# Patient Record
Sex: MALE | Race: WHITE | NOT HISPANIC OR LATINO | Employment: UNEMPLOYED | URBAN - METROPOLITAN AREA
[De-identification: names, ages, dates, MRNs, and addresses within clinical notes are randomized per-mention and may not be internally consistent; named-entity substitution may affect disease eponyms.]

---

## 2020-02-03 ENCOUNTER — HOSPITAL ENCOUNTER (OUTPATIENT)
Dept: EMERGENCY MEDICINE | Facility: HOSPITAL | Age: 55
End: 2020-02-03
Attending: EMERGENCY MEDICINE | Admitting: INTERNAL MEDICINE

## 2020-02-03 LAB
ABS NEUT (OLG): 2.38 X10(3)/MCL (ref 2.1–9.2)
ALBUMIN SERPL-MCNC: 3.8 GM/DL (ref 3.4–5)
ALBUMIN/GLOB SERPL: 1.2 {RATIO}
ALP SERPL-CCNC: 84 UNIT/L (ref 50–136)
ALT SERPL-CCNC: 122 UNIT/L (ref 12–78)
AST SERPL-CCNC: 67 UNIT/L (ref 15–37)
BASOPHILS # BLD AUTO: 0 X10(3)/MCL (ref 0–0.2)
BASOPHILS NFR BLD AUTO: 1 %
BILIRUB SERPL-MCNC: 0.7 MG/DL (ref 0.2–1)
BILIRUBIN DIRECT+TOT PNL SERPL-MCNC: 0.2 MG/DL (ref 0–0.2)
BILIRUBIN DIRECT+TOT PNL SERPL-MCNC: 0.5 MG/DL (ref 0–0.8)
BUN SERPL-MCNC: 10 MG/DL (ref 7–18)
CALCIUM SERPL-MCNC: 8.6 MG/DL (ref 8.5–10.1)
CHLORIDE SERPL-SCNC: 105 MMOL/L (ref 98–107)
CO2 SERPL-SCNC: 25 MMOL/L (ref 21–32)
CREAT SERPL-MCNC: 0.84 MG/DL (ref 0.7–1.3)
EOSINOPHIL # BLD AUTO: 0.2 X10(3)/MCL (ref 0–0.9)
EOSINOPHIL NFR BLD AUTO: 5 %
ERYTHROCYTE [DISTWIDTH] IN BLOOD BY AUTOMATED COUNT: 13.1 % (ref 11.5–17)
GLOBULIN SER-MCNC: 3.2 GM/DL (ref 2.4–3.5)
GLUCOSE SERPL-MCNC: 123 MG/DL (ref 74–106)
HCT VFR BLD AUTO: 49.4 % (ref 42–52)
HGB BLD-MCNC: 16.3 GM/DL (ref 14–18)
LYMPHOCYTES # BLD AUTO: 2 X10(3)/MCL (ref 0.6–4.6)
LYMPHOCYTES NFR BLD AUTO: 39 %
MCH RBC QN AUTO: 31.3 PG (ref 27–31)
MCHC RBC AUTO-ENTMCNC: 33 GM/DL (ref 33–36)
MCV RBC AUTO: 95 FL (ref 80–94)
MONOCYTES # BLD AUTO: 0.6 X10(3)/MCL (ref 0.1–1.3)
MONOCYTES NFR BLD AUTO: 10 %
NEUTROPHILS # BLD AUTO: 2.38 X10(3)/MCL (ref 2.1–9.2)
NEUTROPHILS NFR BLD AUTO: 45 %
PLATELET # BLD AUTO: 254 X10(3)/MCL (ref 130–400)
PMV BLD AUTO: 9.5 FL (ref 9.4–12.4)
POC TROPONIN: 0 NG/ML (ref 0–0.08)
POTASSIUM SERPL-SCNC: 4.4 MMOL/L (ref 3.5–5.1)
PROT SERPL-MCNC: 7 GM/DL (ref 6.4–8.2)
RBC # BLD AUTO: 5.2 X10(6)/MCL (ref 4.7–6.1)
SODIUM SERPL-SCNC: 137 MMOL/L (ref 136–145)
TROPONIN I SERPL-MCNC: <0.02 NG/ML (ref 0.02–0.49)
WBC # SPEC AUTO: 5.3 X10(3)/MCL (ref 4.5–11.5)

## 2022-04-30 NOTE — ED PROVIDER NOTES
Patient:   Boy Mathew             MRN: [id number]            FIN: [id number]               Age:   54 years     Sex:  Male     :  1965   Associated Diagnoses:   Chest pain   Author:   Sujit Gonzales MD      Basic Information   Time seen: Date & time 2/3/2020 09:48:00.   History source: Patient.   Arrival mode: Walking.   History limitation: None.   Additional information: Chief Complaint from Nursing Triage Note : Chief Complaint   2/3/2020 9:22 CST        Chief Complaint           pt reports pov c/o diffuse chest and neck tightness and pain since waking up this morning with SOB. pt states this pain has occurred intermittently x1 week, but wont go away today. denies cardiac hx, n/v/d. hx hep c  .      History of Present Illness   Mr. Mathew is a 54-year-old male with history of hepatitis C, cocaine use, and tobacco use who presents with complaint of chest pain for 1 week.  Patient describes pain as a 10/10 tight/squeezing, substernal, radiating to neck.  Has been occurring intermittently for past week.  Last episode at 8:30 AM morning of presentation and described as worst episode thus far leading him to present to ER.  No aggravating or alleviating factors recalled.  Associated with shortness of breath.  Denies nausea or diaphoresis.  Does admit to cocaine use, last used 2 days ago.  Smoked 2 packs/day for 40 years.  Denies history of heart or lung disease.  No other complaint of fever, cough, congestion, abdominal pain, change in bowel or urinary habits, skin changes, or lower extremity edema.      Review of Systems   Constitutional symptoms:  Negative except as documented in HPI.   Skin symptoms:  Negative except as documented in HPI.   ENMT symptoms:  Negative except as documented in HPI.   Respiratory symptoms:  Negative except as documented in HPI.   Cardiovascular symptoms:  Negative except as documented in HPI.   Gastrointestinal symptoms:  Negative except as documented in HPI.    Genitourinary symptoms:  Negative except as documented in HPI.   Musculoskeletal symptoms:  Negative except as documented in HPI.   Neurologic symptoms:  Negative except as documented in HPI.   Psychiatric symptoms:  Negative except as documented in HPI.             Additional review of systems information: All other systems reviewed and otherwise negative.      Health Status   Allergies: No known allergies.   Medications: None.      Past Medical/ Family/ Social History   Medical history: Hepatitis C, Cocaine abuse, Tobacco Use.   Surgical history: Abdominal hernia repair.   Family history: Denies family history of heart disease.   Social history: Alcohol use: Drinks 3 servings of alcohol daily, Tobacco use: Smokes 2 pack(s) per day, for the last 40 years, Drug use: Cocaine.      Physical Examination               Vital Signs   Vital Signs   2/3/2020 9:22 CST        Temperature Oral          36.3 DegC                             Temperature Oral (calculated)             97.34 DegF                             Peripheral Pulse Rate     97 bpm                             Respiratory Rate          16 br/min                             SpO2                      97 %                             Oxygen Therapy            Room air                             Systolic Blood Pressure   116 mmHg                             Diastolic Blood Pressure  80 mmHg  .   General:  Alert, mild distress.    Skin:  Warm, dry, intact.    Eye:  Extraocular movements are intact.   Neck:  No JVD, no carotid bruit.    Cardiovascular:  Regular rate and rhythm, No murmur, Normal peripheral perfusion, No edema, 2+ radial pulses bilaterally.    Respiratory:  Lungs are clear to auscultation, respirations are non-labored, breath sounds are equal.    Gastrointestinal:  Soft, Nontender, Non distended, Normal bowel sounds.    Musculoskeletal:  Normal ROM, normal strength, no tenderness, no swelling, no deformity.    Neurological:  No focal  neurological deficit observed, normal motor observed, normal speech observed.    Psychiatric:  Cooperative, appropriate mood & affect.       Medical Decision Making   Differential Diagnosis:  Angina, chest wall pain.    Documents reviewed:  Emergency department nurses' notes.   Orders  Launch Order Profile (Selected)   Inpatient Orders  Ordered  aspirin 325 mg oral tablet: 325 mg, form: Tab, Oral, Once, first dose 02/03/20 9:46:00 CST, stop date 02/03/20 9:46:00 CST, STAT, 4 chew tab = 5 grains  nitroglycerin 0.4 mg sublingual TAB: 0.4 mg, form: Tab-SL, SL, Once, first dose 02/03/20 9:45:00 CST, stop date 02/03/20 9:45:00 CST, STAT  nitroglycerin 0.4 mg sublingual TAB: 0.4 mg, form: Tab-SL, SL, q5min PRN for chest pain, first dose 02/03/20 9:46:00 CST, STAT  Ordered (Collected)  Automated Diff: NOW collect, 02/03/20 9:52:00 CST, Blood, Collected, Stop date 02/03/20 9:52:00 CST, Lab Collect, Print Label By Order Location, 02/03/20 9:45:00 CST  CBC w/ Auto Diff: NOW collect, 02/03/20 9:52:56 CST, BLOOD, Collected, Stop date 02/03/20 9:52:00 CST, Lab Collect  CMP: STAT collect, 02/03/20 9:52:56 CST, BLOOD, Collected, Stop date 02/03/20 9:52:00 CST, Lab Collect  POC iSTAT ER Troponin request: BLOOD, STAT collect, Collected, 02/03/20 9:52:56 CST, Stop date 02/03/20 9:52:00 CST, Lab Collect, Print Label By Order Location  Troponin-I: STAT collect, 02/03/20 9:52:56 CST, BLOOD, Collected, Stop date 02/03/20 9:52:00 CST, Lab Collect  Ordered (Exam Ordered)  CXR 2 Views: Routine, 02/03/20 9:45:00 CST, Chest Pain, None, Stretcher, Rad Type, Not Scheduled, 02/03/20 9:45:00 CST.   Electrocardiogram:  Time 2/3/2020 09:19:00, rate 93, normal sinus rhythm, No ST-T changes, normal NM & QRS intervals, EP Interp.    Results review:  Lab results : Lab View   2/3/2020 9:58 CST        POC Troponin              0.00 ng/mL    2/3/2020 9:52 CST        Sodium Lvl                137 mmol/L                             Potassium Lvl              4.4 mmol/L                             Chloride                  105 mmol/L                             CO2                       25.0 mmol/L                             Calcium Lvl               8.6 mg/dL                             Glucose Lvl               123 mg/dL  HI                             BUN                       10.0 mg/dL                             Creatinine                0.84 mg/dL                             eGFR-AA                   >60 mL/min/1.73 m2  NA                             eGFR-BETSY                  >60 mL/min/1.73 m2  NA                             Bili Total                0.7 mg/dL                             Bili Direct               0.20 mg/dL                             Bili Indirect             0.50 mg/dL                             AST                       67 unit/L  HI                             ALT                       122 unit/L  HI                             Alk Phos                  84 unit/L                             Total Protein             7.0 gm/dL                             Albumin Lvl               3.80 gm/dL                             Globulin                  3.20 gm/dL                             A/G Ratio                 1.2  NA                             Troponin-I                <0.02 ng/mL                             WBC                       5.3 x10(3)/mcL                             RBC                       5.20 x10(6)/mcL                             Hgb                       16.3 gm/dL                             Hct                       49.4 %                             Platelet                  254 x10(3)/mcL                             MCV                       95.0 fL  HI                             MCH                       31.3 pg  HI                             MCHC                      33.0 gm/dL                             RDW                       13.1 %                             MPV                       9.5 fL                              Abs Neut                  2.38 x10(3)/mcL                             Neutro Auto               45 %  NA                             Lymph Auto                39 %  NA                             Mono Auto                 10 %  NA                             Eos Auto                  5 %  NA                             Abs Eos                   0.2 x10(3)/mcL                             Basophil Auto             1 %  NA                             Abs Neutro                2.38 x10(3)/mcL                             Abs Lymph                 2.0 x10(3)/mcL                             Abs Mono                  0.6 x10(3)/mcL                             Abs Baso                  0.0 x10(3)/mcL  .   Radiology results:  Rad Results (ST)  < 12 hrs   Accession: [id number]  Order: XR Chest 2 Views  Report Dt/Tm: 02/03/2020 10:39  Report:      CHEST X-RAYS (2 Views):     CPT: 13005     HISTORY:  Chest Pain     FINDINGS:  Examination reveals mediastinal and cardiac silhouettes to be within  normal limits. Lung fields are clear and free of gross infiltrates  atelectases or effusions.     IMPRESSION: No active pulmonary disease    .      Reexamination/ Reevaluation   Time: 2/3/2020 11:05:00 .   Vital signs   results included from flowsheet : Vital Signs   2/3/2020 11:00 CST       Peripheral Pulse Rate     76 bpm                             Respiratory Rate          15 br/min                             SpO2                      93 %  LOW                             Oxygen Therapy            Room air                             Systolic Blood Pressure   101 mmHg                             Diastolic Blood Pressure  69 mmHg                             Mean Arterial Pressure, Cuff              80 mmHg     Course: improving.   Pain status: decreased.   Notes: Pain improved to 2/10 after SL nitro x 3 doses.  Received 325mg aspirin.  Troponin negative, repeat EKG unchanged and without ST changes.  CXR without acute  abnormality.  Consult placed to Cardiology for admission for chest pain and ACS rule out.      Impression and Plan   Diagnosis   Chest pain (BFE87-MI R07.9)      Calls-Consults   -  2/3/2020 11:13:00 , Cardiology, Spoke with DANIEL Giron, with Cardiology who recommends admission.  .    Plan   Condition: Guarded.    Disposition: Admit time  2/3/2020 11:22:00, Place in Observation Telemetry Unit.    Counseled: Patient, Regarding diagnosis, Regarding diagnostic results, Regarding treatment plan, Patient indicated understanding of instructions.      Sujit Gonzales MD  [facility], PGY-2

## 2022-04-30 NOTE — ED PROVIDER NOTES
Patient:   Boy Mathew             MRN: [id number]            FIN: [id number]               Age:   54 years     Sex:  Male     :  1965   Associated Diagnoses:   None   Author:   Linda GLASER MD, Luis Alberto MARTÍNEZ      Addendum      Teaching-Supervisory Addendum-Brief   I participated in the following activities of this patients care: the medical history, the physical exam, medical decision making.   I personally performed: the medical history, the physical exam, the medical decision making.   The case was discussed with: the resident.   Evaluation and management service: I agree with the evaluation and management decisions made in this patient's care.   Time seen: 2/3/2020 .   Vital signs: Basic Oxygen Information   2/3/2020 12:30 CST       SpO2                      98 %                             Oxygen Therapy            Room air    2/3/2020 12:00 CST       SpO2                      98 %                             Oxygen Therapy            Room air    2/3/2020 11:00 CST       SpO2                      93 %  LOW                             Oxygen Therapy            Room air    2/3/2020 10:04 CST       SpO2                      95 %                             Oxygen Therapy            Room air    2/3/2020 9:55 CST        SpO2                      95 %                             Oxygen Therapy            Room air    2/3/2020 9:45 CST        SpO2                      97 %                             Oxygen Therapy            Room air    2/3/2020 9:22 CST        SpO2                      97 %                             Oxygen Therapy            Room air  .   Notes: Patient was seen and evaluated by the resident I've also seen and evaluated this patient and performed my own independent history and physical this is a 54-year-old gentleman with a history of high blood pressure tobacco and cocaine abuse who is had intermittent chest pain for a week and it started hurting abruptly worse earlier today substernal  pressure going into the neck and left shoulder.  Patient no nausea no vomiting no history previous coronary artery disease or evaluation.  Patient EKG does not reveal any abnormalities on exam patient mildly anxious chest clear abdomen soft not suicidal or homicidal last cocaine use was 2 days ago.  EKG cardiac enzymes without abnormality some relief with nitroglycerin discussed case with cardiology will admit.  Cardiology did admit and evaluate patient for coronary angiogram patient did not leave the ER for cath lab or floor but of note patient abruptly left AMA cardiology was notified.

## 2022-10-10 DIAGNOSIS — K40.90 RIGHT INGUINAL HERNIA: Primary | ICD-10-CM

## 2023-12-27 ENCOUNTER — HOSPITAL ENCOUNTER (INPATIENT)
Facility: HOSPITAL | Age: 58
LOS: 1 days | Discharge: LEFT BEFORE DISCHARGE | DRG: 193 | End: 2023-12-28
Attending: STUDENT IN AN ORGANIZED HEALTH CARE EDUCATION/TRAINING PROGRAM | Admitting: INTERNAL MEDICINE
Payer: MEDICAID

## 2023-12-27 DIAGNOSIS — R09.02 HYPOXIA: ICD-10-CM

## 2023-12-27 DIAGNOSIS — J18.9 MULTIFOCAL PNEUMONIA: Primary | ICD-10-CM

## 2023-12-27 DIAGNOSIS — R06.02 SOB (SHORTNESS OF BREATH): ICD-10-CM

## 2023-12-27 LAB
ALBUMIN SERPL-MCNC: 3.6 G/DL (ref 3.5–5)
ALBUMIN/GLOB SERPL: 1 RATIO (ref 1.1–2)
ALP SERPL-CCNC: 70 UNIT/L (ref 40–150)
ALT SERPL-CCNC: 60 UNIT/L (ref 0–55)
AST SERPL-CCNC: 45 UNIT/L (ref 5–34)
B PERT.PT PRMT NPH QL NAA+NON-PROBE: NOT DETECTED
BASOPHILS # BLD AUTO: 0.07 X10(3)/MCL
BASOPHILS NFR BLD AUTO: 0.9 %
BILIRUB SERPL-MCNC: 0.4 MG/DL
BNP BLD-MCNC: 24.2 PG/ML
BUN SERPL-MCNC: 13.1 MG/DL (ref 8.4–25.7)
C PNEUM DNA NPH QL NAA+NON-PROBE: NOT DETECTED
CALCIUM SERPL-MCNC: 9 MG/DL (ref 8.4–10.2)
CHLORIDE SERPL-SCNC: 106 MMOL/L (ref 98–107)
CO2 SERPL-SCNC: 28 MMOL/L (ref 22–29)
CREAT SERPL-MCNC: 0.95 MG/DL (ref 0.73–1.18)
EOSINOPHIL # BLD AUTO: 0.22 X10(3)/MCL (ref 0–0.9)
EOSINOPHIL NFR BLD AUTO: 2.9 %
ERYTHROCYTE [DISTWIDTH] IN BLOOD BY AUTOMATED COUNT: 13.3 % (ref 11.5–17)
FLUAV AG UPPER RESP QL IA.RAPID: NOT DETECTED
FLUAV AG UPPER RESP QL IA.RAPID: NOT DETECTED
FLUBV AG UPPER RESP QL IA.RAPID: NOT DETECTED
FLUBV AG UPPER RESP QL IA.RAPID: NOT DETECTED
GFR SERPLBLD CREATININE-BSD FMLA CKD-EPI: >60 MLS/MIN/1.73/M2
GLOBULIN SER-MCNC: 3.5 GM/DL (ref 2.4–3.5)
GLUCOSE SERPL-MCNC: 139 MG/DL (ref 74–100)
HADV DNA NPH QL NAA+NON-PROBE: NOT DETECTED
HCOV 229E RNA NPH QL NAA+NON-PROBE: NOT DETECTED
HCOV HKU1 RNA NPH QL NAA+NON-PROBE: NOT DETECTED
HCOV NL63 RNA NPH QL NAA+NON-PROBE: NOT DETECTED
HCOV OC43 RNA NPH QL NAA+NON-PROBE: NOT DETECTED
HCT VFR BLD AUTO: 48.9 % (ref 42–52)
HGB BLD-MCNC: 15.7 G/DL (ref 14–18)
HIV 1+2 AB+HIV1 P24 AG SERPL QL IA: NONREACTIVE
HMPV RNA NPH QL NAA+NON-PROBE: NOT DETECTED
HPIV1 RNA NPH QL NAA+NON-PROBE: NOT DETECTED
HPIV2 RNA NPH QL NAA+NON-PROBE: NOT DETECTED
HPIV3 RNA NPH QL NAA+NON-PROBE: NOT DETECTED
HPIV4 RNA NPH QL NAA+NON-PROBE: NOT DETECTED
IMM GRANULOCYTES # BLD AUTO: 0.02 X10(3)/MCL (ref 0–0.04)
IMM GRANULOCYTES NFR BLD AUTO: 0.3 %
LACTATE SERPL-SCNC: 2 MMOL/L (ref 0.5–2.2)
LYMPHOCYTES # BLD AUTO: 2.41 X10(3)/MCL (ref 0.6–4.6)
LYMPHOCYTES NFR BLD AUTO: 32.1 %
M PNEUMO DNA NPH QL NAA+NON-PROBE: NOT DETECTED
MCH RBC QN AUTO: 30.4 PG (ref 27–31)
MCHC RBC AUTO-ENTMCNC: 32.1 G/DL (ref 33–36)
MCV RBC AUTO: 94.8 FL (ref 80–94)
MONOCYTES # BLD AUTO: 0.7 X10(3)/MCL (ref 0.1–1.3)
MONOCYTES NFR BLD AUTO: 9.3 %
MRSA PCR SCRN (OHS): NOT DETECTED
NEUTROPHILS # BLD AUTO: 4.09 X10(3)/MCL (ref 2.1–9.2)
NEUTROPHILS NFR BLD AUTO: 54.5 %
NRBC BLD AUTO-RTO: 0 %
PLATELET # BLD AUTO: 305 X10(3)/MCL (ref 130–400)
PMV BLD AUTO: 9 FL (ref 7.4–10.4)
POTASSIUM SERPL-SCNC: 4.2 MMOL/L (ref 3.5–5.1)
PROT SERPL-MCNC: 7.1 GM/DL (ref 6.4–8.3)
RBC # BLD AUTO: 5.16 X10(6)/MCL (ref 4.7–6.1)
RSV A 5' UTR RNA NPH QL NAA+PROBE: NOT DETECTED
RSV RNA NPH QL NAA+NON-PROBE: NOT DETECTED
RV+EV RNA NPH QL NAA+NON-PROBE: NOT DETECTED
SARS-COV-2 RNA RESP QL NAA+PROBE: NOT DETECTED
SARS-COV-2 RNA RESP QL NAA+PROBE: NOT DETECTED
SODIUM SERPL-SCNC: 141 MMOL/L (ref 136–145)
STREP A PCR (OHS): NOT DETECTED
TROPONIN I SERPL-MCNC: <0.01 NG/ML (ref 0–0.04)
WBC # SPEC AUTO: 7.51 X10(3)/MCL (ref 4.5–11.5)

## 2023-12-27 PROCEDURE — 94640 AIRWAY INHALATION TREATMENT: CPT

## 2023-12-27 PROCEDURE — 87040 BLOOD CULTURE FOR BACTERIA: CPT | Performed by: STUDENT IN AN ORGANIZED HEALTH CARE EDUCATION/TRAINING PROGRAM

## 2023-12-27 PROCEDURE — 87641 MR-STAPH DNA AMP PROBE: CPT | Performed by: INTERNAL MEDICINE

## 2023-12-27 PROCEDURE — 87798 DETECT AGENT NOS DNA AMP: CPT | Performed by: NURSE PRACTITIONER

## 2023-12-27 PROCEDURE — 99285 EMERGENCY DEPT VISIT HI MDM: CPT | Mod: 25

## 2023-12-27 PROCEDURE — 96375 TX/PRO/DX INJ NEW DRUG ADDON: CPT

## 2023-12-27 PROCEDURE — 85025 COMPLETE CBC W/AUTO DIFF WBC: CPT

## 2023-12-27 PROCEDURE — 25000003 PHARM REV CODE 250: Performed by: NURSE PRACTITIONER

## 2023-12-27 PROCEDURE — 80053 COMPREHEN METABOLIC PANEL: CPT

## 2023-12-27 PROCEDURE — 0241U COVID/RSV/FLU A&B PCR: CPT | Performed by: INTERNAL MEDICINE

## 2023-12-27 PROCEDURE — 93005 ELECTROCARDIOGRAM TRACING: CPT

## 2023-12-27 PROCEDURE — 83605 ASSAY OF LACTIC ACID: CPT | Performed by: STUDENT IN AN ORGANIZED HEALTH CARE EDUCATION/TRAINING PROGRAM

## 2023-12-27 PROCEDURE — 87651 STREP A DNA AMP PROBE: CPT

## 2023-12-27 PROCEDURE — 25000242 PHARM REV CODE 250 ALT 637 W/ HCPCS: Performed by: STUDENT IN AN ORGANIZED HEALTH CARE EDUCATION/TRAINING PROGRAM

## 2023-12-27 PROCEDURE — S4991 NICOTINE PATCH NONLEGEND: HCPCS | Performed by: NURSE PRACTITIONER

## 2023-12-27 PROCEDURE — 87449 NOS EACH ORGANISM AG IA: CPT | Performed by: INTERNAL MEDICINE

## 2023-12-27 PROCEDURE — 93010 EKG 12-LEAD: ICD-10-PCS | Mod: ,,, | Performed by: INTERNAL MEDICINE

## 2023-12-27 PROCEDURE — 0240U COVID/FLU A&B PCR: CPT

## 2023-12-27 PROCEDURE — 86480 TB TEST CELL IMMUN MEASURE: CPT | Performed by: INTERNAL MEDICINE

## 2023-12-27 PROCEDURE — 84484 ASSAY OF TROPONIN QUANT: CPT

## 2023-12-27 PROCEDURE — 87305 ASPERGILLUS AG IA: CPT | Performed by: INTERNAL MEDICINE

## 2023-12-27 PROCEDURE — 63600175 PHARM REV CODE 636 W HCPCS: Performed by: STUDENT IN AN ORGANIZED HEALTH CARE EDUCATION/TRAINING PROGRAM

## 2023-12-27 PROCEDURE — 94640 AIRWAY INHALATION TREATMENT: CPT | Mod: XB

## 2023-12-27 PROCEDURE — 25000003 PHARM REV CODE 250: Performed by: STUDENT IN AN ORGANIZED HEALTH CARE EDUCATION/TRAINING PROGRAM

## 2023-12-27 PROCEDURE — 93010 ELECTROCARDIOGRAM REPORT: CPT | Mod: ,,, | Performed by: INTERNAL MEDICINE

## 2023-12-27 PROCEDURE — 25500020 PHARM REV CODE 255: Performed by: STUDENT IN AN ORGANIZED HEALTH CARE EDUCATION/TRAINING PROGRAM

## 2023-12-27 PROCEDURE — 11000001 HC ACUTE MED/SURG PRIVATE ROOM

## 2023-12-27 PROCEDURE — 87389 HIV-1 AG W/HIV-1&-2 AB AG IA: CPT | Performed by: INTERNAL MEDICINE

## 2023-12-27 PROCEDURE — 96374 THER/PROPH/DIAG INJ IV PUSH: CPT

## 2023-12-27 PROCEDURE — 83880 ASSAY OF NATRIURETIC PEPTIDE: CPT

## 2023-12-27 PROCEDURE — 99900035 HC TECH TIME PER 15 MIN (STAT)

## 2023-12-27 RX ORDER — DEXAMETHASONE SODIUM PHOSPHATE 4 MG/ML
8 INJECTION, SOLUTION INTRA-ARTICULAR; INTRALESIONAL; INTRAMUSCULAR; INTRAVENOUS; SOFT TISSUE
Status: COMPLETED | OUTPATIENT
Start: 2023-12-27 | End: 2023-12-27

## 2023-12-27 RX ORDER — IBUPROFEN 200 MG
1 TABLET ORAL DAILY
Status: DISCONTINUED | OUTPATIENT
Start: 2023-12-28 | End: 2023-12-27

## 2023-12-27 RX ORDER — IPRATROPIUM BROMIDE AND ALBUTEROL SULFATE 2.5; .5 MG/3ML; MG/3ML
3 SOLUTION RESPIRATORY (INHALATION)
Status: COMPLETED | OUTPATIENT
Start: 2023-12-27 | End: 2023-12-27

## 2023-12-27 RX ORDER — IPRATROPIUM BROMIDE AND ALBUTEROL SULFATE 2.5; .5 MG/3ML; MG/3ML
3 SOLUTION RESPIRATORY (INHALATION) EVERY 4 HOURS PRN
Status: DISCONTINUED | OUTPATIENT
Start: 2023-12-27 | End: 2023-12-28 | Stop reason: HOSPADM

## 2023-12-27 RX ORDER — DEXAMETHASONE SODIUM PHOSPHATE 4 MG/ML
4 INJECTION, SOLUTION INTRA-ARTICULAR; INTRALESIONAL; INTRAMUSCULAR; INTRAVENOUS; SOFT TISSUE EVERY 12 HOURS
Status: DISCONTINUED | OUTPATIENT
Start: 2023-12-28 | End: 2023-12-28 | Stop reason: HOSPADM

## 2023-12-27 RX ORDER — IBUPROFEN 200 MG
1 TABLET ORAL DAILY
Status: DISCONTINUED | OUTPATIENT
Start: 2023-12-27 | End: 2023-12-28 | Stop reason: HOSPADM

## 2023-12-27 RX ADMIN — NICOTINE 1 PATCH: 21 PATCH, EXTENDED RELEASE TRANSDERMAL at 08:12

## 2023-12-27 RX ADMIN — SODIUM CHLORIDE, POTASSIUM CHLORIDE, SODIUM LACTATE AND CALCIUM CHLORIDE 1000 ML: 600; 310; 30; 20 INJECTION, SOLUTION INTRAVENOUS at 03:12

## 2023-12-27 RX ADMIN — IPRATROPIUM BROMIDE AND ALBUTEROL SULFATE 3 ML: 2.5; .5 SOLUTION RESPIRATORY (INHALATION) at 01:12

## 2023-12-27 RX ADMIN — IOPAMIDOL 100 ML: 755 INJECTION, SOLUTION INTRAVENOUS at 02:12

## 2023-12-27 RX ADMIN — CEFTRIAXONE 1 G: 1 INJECTION, POWDER, FOR SOLUTION INTRAMUSCULAR; INTRAVENOUS at 03:12

## 2023-12-27 RX ADMIN — DEXAMETHASONE SODIUM PHOSPHATE 8 MG: 4 INJECTION, SOLUTION INTRA-ARTICULAR; INTRALESIONAL; INTRAMUSCULAR; INTRAVENOUS; SOFT TISSUE at 01:12

## 2023-12-27 RX ADMIN — AZITHROMYCIN MONOHYDRATE 500 MG: 500 INJECTION, POWDER, LYOPHILIZED, FOR SOLUTION INTRAVENOUS at 04:12

## 2023-12-27 RX ADMIN — IPRATROPIUM BROMIDE AND ALBUTEROL SULFATE 3 ML: 2.5; .5 SOLUTION RESPIRATORY (INHALATION) at 03:12

## 2023-12-27 NOTE — H&P
[facility]  Hospital Medicine History & Physical Examination       Patient Name: Boy Mathew  MRN: [id number]  Patient Class: Emergency   Admission Date: 12/27/2023   Admitting Service: Hospital Medicine   Length of Stay: 0  Attending Physician: Wenceslao Moran MD  Primary Care Provider: Geri, Primary Doctor  Face-to-Face encounter date: 12/27/2023  Code Status:  Full  Chief Complaint: Generalized Body Aches (Pt c/o body aches/subjective fever/chills/cough/sore throat m0lnfrs ago off and on. Pt also reports chest pain/SOB x1 month. Pt reports that he has mold in his trailer. )      Patient information was obtained from patient, patient's family, past medical records and ER records.      HISTORY OF PRESENT ILLNESS:   Boy Mathew is a 58 y.o. male with a PMHx of tobacco abuse who presented to [facility] on 12/27/2023 with c/o generalized body aches x4 days.  He also endorsed subjective fevers, chills, abdominal pain, nausea, vomiting, cough, sore throat that has been intermittent over the past 1 month along with SOB.  He reports that his home has mold present in his home that he was remodeling.    Upon presentation to ED, vital signed included /71, HR 68, RR 20, SpO2 91% on room air, temperature 98.5° F. He was subsequently placed on supplemental oxygen at 2 L via nasal cannula.  Labs were notable for glucose 139, AST 45, ALT 60.  BNP normal.  Troponin undetectable.  Influenza, Strep A, and COVID-19 negative.  Blood cultures x2 obtained.  CXR negative for acute cardiopulmonary process.  CT chest abdomen and pelvis with IV contrast demonstrated indeterminate enlarged left axillary and pectoral lymph nodes, left upper lobe opacities favored infectious or inflammatory; also noted associated bronchiectasis and mild emphysema.  He was started on IV ceftriaxone and azithromycin.  He required duo nebs x2 and was also given IV Decadron in ED. admitted to hospital medicine services for further  medical management.    REVIEW OF SYSTEMS:   Except as documented, all other systems reviewed and negative     PAST MEDICAL HISTORY:   Tobacco abuse    PAST SURGICAL HISTORY:   Reviewed and negative    FAMILY HISTORY:   Reviewed and negative    SOCIAL HISTORY:   Denied alcohol, illicit drug use.  Smokes 2 packs of cigarettes per day.    ALLERGIES:   Patient has no known allergies.    HOME MEDICATIONS:     Prior to Admission medications    Not on File     ________________________________________________________________________  INPATIENT LIST OF MEDICATIONS     Current Facility-Administered Medications:     albuterol-ipratropium 2.5 mg-0.5 mg/3 mL nebulizer solution 3 mL, 3 mL, Nebulization, Q4H PRN, Irena Lacey AGACNP-BC    [START ON 12/28/2023] azithromycin (ZITHROMAX) 500 mg in dextrose 5 % (D5W) 250 mL IVPB, 500 mg, Intravenous, Q24H, Irena Lacey AGACNP-BC    azithromycin 500 mg in dextrose 5 % 250 mL IVPB (ready to mix), 500 mg, Intravenous, ED 1 Time, Ephraim Baker MD    cefTRIAXone (ROCEPHIN) 1 g in dextrose 5 % in water (D5W) 100 mL IVPB (MB+), 1 g, Intravenous, ED 1 Time, Ephraim Baker MD, Last Rate: 200 mL/hr at 12/27/23 1550, 1 g at 12/27/23 1550    [START ON 12/28/2023] cefTRIAXone (ROCEPHIN) 1 g in dextrose 5 % in water (D5W) 100 mL IVPB (MB+), 1 g, Intravenous, Q24H, Irena Lacey AGACNP-BC    [START ON 12/28/2023] dexAMETHasone injection 4 mg, 4 mg, Intravenous, Q12H, Irena Lacey AGACNP-BC    lactated ringers bolus 1,000 mL, 1,000 mL, Intravenous, ED 1 Time, Ephraim Baker MD, Last Rate: 999 mL/hr at 12/27/23 1515, 1,000 mL at 12/27/23 1515  No current outpatient medications on file.    Scheduled Meds:   [START ON 12/28/2023] azithromycin (ZITHROMAX) 500 mg in dextrose 5 % (D5W) 250 mL IVPB  500 mg Intravenous Q24H    azithromycin  500 mg Intravenous ED 1 Time    cefTRIAXone (ROCEPHIN) IVPB  1 g Intravenous ED 1 Time    [START ON 12/28/2023] cefTRIAXone  (ROCEPHIN) IVPB  1 g Intravenous Q24H    [START ON 12/28/2023] dexAMETHasone  4 mg Intravenous Q12H    lactated ringers  1,000 mL Intravenous ED 1 Time     Continuous Infusions:  PRN Meds:.albuterol-ipratropium    PHYSICAL EXAM:     VITAL SIGNS: 24 HRS MIN & MAX LAST   Temp  Min: 98.5 °F (36.9 °C)  Max: 98.5 °F (36.9 °C) 98.5 °F (36.9 °C)   BP  Min: 103/77  Max: 117/72 108/70   Pulse  Min: 66  Max: 81  81   Resp  Min: 12  Max: 20 13   SpO2  Min: 87 %  Max: 98 % 96 %       General appearance: Well-developed male in no apparent distress.  HENT: Atraumatic head. Moist mucous membranes of oral cavity.  Eyes: Normal extraocular movements.   Neck: Supple.   Lungs: Clear to auscultation bilaterally.  On oxygen.  Heart: Regular rate and rhythm. S1 and S2 present. No pedal edema.  Abdomen: Soft, non-distended, non-tender.  Extremities: No cyanosis, clubbing  Skin: No Rash.   Neuro: Motor and sensory exams grossly intact.   Psych/mental status: Appropriate mood and affect. Responds appropriately to questions.     LABS AND IMAGING:     Recent Labs   Lab 12/27/23  1112   WBC 7.51   RBC 5.16   HGB 15.7   HCT 48.9   MCV 94.8*   MCH 30.4   MCHC 32.1*   RDW 13.3      MPV 9.0       Recent Labs   Lab 12/27/23  1112      K 4.2   CO2 28   BUN 13.1   CREATININE 0.95   CALCIUM 9.0   ALBUMIN 3.6   ALKPHOS 70   ALT 60*   AST 45*   BILITOT 0.4       Microbiology Results (last 7 days)       Procedure Component Value Units Date/Time    Blood Culture #1 **CANNOT BE ORDERED STAT** [[id number]] Collected: 12/27/23 1536    Order Status: Sent Specimen: Blood Updated: 12/27/23 1548    Blood Culture #2 **CANNOT BE ORDERED STAT** [[id number]] Collected: 12/27/23 1536    Order Status: Sent Specimen: Blood Updated: 12/27/23 1547             CT Chest Abdomen Pelvis With IV Contrast (XPD) NO Oral Contrast  Narrative: EXAMINATION:  CT CHEST ABDOMEN PELVIS WITH IV CONTRAST (XPD)    CLINICAL HISTORY:  productive cough, sob, RLQ abdominal  pain;    TECHNIQUE:  Helical acquisition from the thoracic inlet through the ischia with  IV contrast. Three plane reconstructions made available for review.  mGycm. Automatic exposure control, adjustment of mA/kV or iterative reconstruction technique was used to reduce radiation.    COMPARISON:  None available.    FINDINGS:  Chest.    Heart is not enlarged.  There are coronary artery calcifications.    There is no mediastinal or hilar adenopathy.  There are enlarged left axillary and pectoral lymph nodes.  For example image 26 series 2 measures 14 mm short axis.    There is no pleural effusion.  Mild atelectasis or scarring towards the lung bases.  There is irregular left upper lobe consolidation medially.  There is associated bronchiectasis with debris in bronchi.  Possible broncholiths image 31 series 2.  There is mild emphysema.    Abdomen and pelvis.    The liver is mildly enlarged.  There are gallstones.  No pericholecystic inflammation.  Spleen, pancreas and adrenals within normal limits.  No hydronephrosis or suspicious renal lesion.    There is no bowel obstruction.  Normal appendix.  Moderate stool in the colon.    Urinary bladder unremarkable.  No pelvic free fluid.  Abdominal aorta normal in caliber.  Moderate atherosclerotic disease.  Small fat containing right inguinal hernia.  No enlarged retroperitoneal lymph nodes.    There are mild degenerative changes of the spine.  Impression: 1. Indeterminate enlarged left axillary and pectoral lymph nodes.  2. Left upper lobe opacities favored infectious or inflammatory.  Recommend a follow-up chest CT in 1-2 months.  3. No acute abdominopelvic findings.    Electronically signed by: Gibson March  Date:    12/27/2023  Time:    14:54  X-Ray Chest PA And Lateral  Narrative: EXAMINATION:  XR CHEST PA AND LATERAL    CLINICAL HISTORY:  Shortness of breath    TECHNIQUE:  Two views of the chest    COMPARISON:  02/03/2020    FINDINGS:  No focal opacification,  pleural effusion, or pneumothorax.    The cardiomediastinal silhouette is within normal limits.    No acute osseous abnormality.  Impression: No acute cardiopulmonary process.    Electronically signed by: Varun Patricia  Date:    12/27/2023  Time:    11:10        ASSESSMENT & PLAN:     Acute hypoxemic respiratory failure   Left upper lobe pneumonia   Tobacco abuse  Transaminitis    Plan:   Supplemental O2 as needed to maintain oxygen saturation greater than 92%   IV ceftriaxone azithromycin daily   Respiratory panel and RSV PCR ordered   Nicotine patch daily   Abdominal ultrasound ordered to evaluate for elevated AST/ALT  Labs in a.m.      VTE Prophylaxis: SCDs    Discharge Planning and Disposition: TBD    I, Irena Lacey, NP have reviewed and discussed the case with Wenceslao Moran MD  Please see the attending MD's addendum for further assessment and plan.    Irena Lacey, [facility]  Department of Hospital Medicine   [facility]   12/27/2023    _______________________________________________________________________________  MD Addendum:  I, Dr.Praneet Dean MD , assumed care of this patient today   For the patient encounter, I performed the substantive portion of the visit, I reviewed the NP/PA documentation, treatment plan, and medical decision making.  I had face to face time with this patient     58-year-old male presented to hospital for evaluation of generalized body aches, subjective fevers, chills, intermittent cough productive sore throat  associated with worsening dyspnea on exertion for the past month.  He also reported losing 30 lb of weight in the past 3 months.  Currently living in a trailer and reported is exposed to mold.    Patient reported he had pulmonary tuberculosis approximately 10 years ago when he was in prison, he claimed he was treated and completed the treatment but reported he was treated for at least a month unsure of the total duration of the  treatment did not follow up with anyone later.    While in the emergency room patient was noted to be hypoxic 87% on room air, improved sats above 94 with a 2 L of nasal cannula.  COVID flu RSV negative, strep throat negative respiratory panel negative.  Labs with no leukocytosis.  BNP normal , trop wnl    CT chest obtained showed indeterminate enlarged left axillary and pectoral lymph nodes with a left upper lobe irregular consolidation medially associated with bronchiectasis with the debris in the bronchi favoring infectious or inflammatory etiology.    Patient is alert and oriented no acute distress on nasal cannula able to complete sentences lung exam air movement good with some rhonchi on the left side abdomen soft.    A/p:  Left upper lobe irregular consolidation medially -R/o TB or any infectious etiology  Hx: TB?       Unclear if patient had any viral pneumonia followed by any superimposed bacterial versus any kind of fungal pneumonia versus reactivation of tuberculosis    Check QuantiFERON , AM sputum samples for AFB x3 check beta D glucan, galactomannan, PJP PCR, HIV  Send respiratory culture, MRSA PCR   empiric antibiotic therapy   Supplemental oxygen target sats 92-94   Steroids  Derrick p.r.n.   Home medications reviewed with the patient, reported he does not take any medications at home  Airborne precautions  Consider pulmonary or infectious disease consultation if any of the test comes back positive    Code status: Full code    12/27/2023

## 2023-12-27 NOTE — ED PROVIDER NOTES
Encounter Date: 12/27/2023    SCRIBE #1 NOTE: I, Chela Contreras, am scribing for, and in the presence of,  Ephraim Baker MD. I have scribed the following portions of the note - Other sections scribed: HPI, ROS, PE.       History     Chief Complaint   Patient presents with    Generalized Body Aches     Pt c/o body aches/subjective fever/chills/cough/sore throat [id number] ago off and on. Pt also reports chest pain/SOB x1 month. Pt reports that he has mold in his trailer.      Patient is a 58 year old male with no significant history that presents to the ED for generalized body aches onset 4 days ago. Patient reports associated symptoms of fever, chills, abdominal pain, nausea, and vomiting. Patient smokes a couple packs of cigarettes a day.     The history is provided by the patient and medical records. No  was used.     Review of patient's allergies indicates:  No Known Allergies  No past medical history on file.  No past surgical history on file.  No family history on file.     Review of Systems   Constitutional:  Positive for chills and fever.        Generalized body aches    HENT:  Negative for drooling.    Eyes:  Negative for pain and redness.   Respiratory:  Negative for shortness of breath, wheezing and stridor.    Cardiovascular:  Negative for chest pain, palpitations and leg swelling.   Gastrointestinal:  Positive for nausea and vomiting. Negative for abdominal pain.   Genitourinary:  Negative for dysuria and hematuria.   Musculoskeletal:  Negative for back pain, neck pain and neck stiffness.   Skin:  Negative for rash and wound.   Neurological:  Negative for weakness and numbness.   Hematological:  Does not bruise/bleed easily.       Physical Exam     Initial Vitals [12/27/23 1034]   BP Pulse Resp Temp SpO2   115/71 68 20 98.5 °F (36.9 °C) (!) 91 %      MAP       --         Physical Exam    Nursing note and vitals reviewed.  Constitutional: He appears well-developed. He is not  diaphoretic. No distress.   HENT:   Head: Normocephalic and atraumatic.   Nose: Nose normal.   Mouth/Throat: Oropharynx is clear and moist.   Eyes: Conjunctivae and EOM are normal. Pupils are equal, round, and reactive to light.   Cardiovascular:  Normal rate and regular rhythm.           No murmur heard.  Pulmonary/Chest: No respiratory distress. He has no rales.   Scattered wheezes bilaterally.    Abdominal: Abdomen is soft. He exhibits no distension.   Generalized abdominal tenderness  There is no rebound and no guarding.     Neurological: He is alert and oriented to person, place, and time. He has normal strength. No cranial nerve deficit.   Skin: Skin is warm. Capillary refill takes less than 2 seconds. No rash noted.         ED Course   Critical Care    Date/Time: 12/27/2023 10:37 PM    Performed by: Ephraim Baker MD  Authorized by: Ephraim Baker MD  Direct patient critical care time: 35 minutes  Total critical care time (exclusive of procedural time) : 35 minutes  Critical care time was exclusive of separately billable procedures and treating other patients.  Critical care was necessary to treat or prevent imminent or life-threatening deterioration of the following conditions: respiratory failure.  Critical care was time spent personally by me on the following activities: development of treatment plan with patient or surrogate, discussions with consultants, evaluation of patient's response to treatment, obtaining history from patient or surrogate, examination of patient, ordering and performing treatments and interventions, ordering and review of laboratory studies, ordering and review of radiographic studies, re-evaluation of patient's condition, pulse oximetry and review of old charts.        Labs Reviewed   COMPREHENSIVE METABOLIC PANEL - Abnormal; Notable for the following components:       Result Value    Glucose Level 139 (*)     Albumin/Globulin Ratio 1.0 (*)     Alanine  Aminotransferase 60 (*)     Aspartate Aminotransferase 45 (*)     All other components within normal limits   CBC WITH DIFFERENTIAL - Abnormal; Notable for the following components:    MCV 94.8 (*)     MCHC 32.1 (*)     All other components within normal limits   B-TYPE NATRIURETIC PEPTIDE - Normal   COVID/FLU A&B PCR - Normal    Narrative:     The Xpert Xpress SARS-CoV-2/FLU/RSV plus is a rapid, multiplexed real-time PCR test intended for the simultaneous qualitative detection and differentiation of SARS-CoV-2, Influenza A, Influenza B, and respiratory syncytial virus (RSV) viral RNA in either nasopharyngeal swab or nasal swab specimens.         TROPONIN I - Normal   STREP GROUP A BY PCR - Normal    Narrative:     The Xpert Xpress Strep A test is a rapid, qualitative in vitro diagnostic test for the detection of Streptococcus pyogenes (Group A ß-hemolytic Streptococcus, Strep A) in throat swab specimens from patients with signs and symptoms of pharyngitis.     LACTIC ACID, PLASMA - Normal   RESPIRATORY PANEL - Normal    Narrative:     The BioFire Respiratory Panel 2.1 (RP2.1) is a PCR-based multiplexed nucleic acid test intended for use with the BioFire® 2.0 for simultaneous qualitative detection and identification of multiple respiratory viral and bacterial nucleic acids in nasopharyngeal swabs (NPS) obtained from individuals suspected of respiratory tract infections.   COVID/RSV/FLU A&B PCR - Normal    Narrative:     The Xpert Xpress SARS-CoV-2/FLU/RSV plus is a rapid, multiplexed real-time PCR test intended for the simultaneous qualitative detection and differentiation of SARS-CoV-2, Influenza A, Influenza B, and respiratory syncytial virus (RSV) viral RNA in either nasopharyngeal swab or nasal swab specimens.         MRSA PCR - Normal    Narrative:     The Xpert MRSA Assay utilizes automated real-time polymerase chain reaction (PCR) to detect MRSA DNA.  A positive test result does not necessarily indicate the  presence of viable organism.  It is however, presumptive for the presence of MRSA.   HIV 1 / 2 ANTIBODY - Normal   BLOOD CULTURE [facility]   BLOOD CULTURE [facility]   RESPIRATORY CULTURE ([facility])   CBC W/ AUTO DIFFERENTIAL    Narrative:     The following orders were created for panel order CBC auto differential.  Procedure                               Abnormality         Status                     ---------                               -----------         ------                     CBC with Differential[[id number]]        Abnormal            Final result                 Please view results for these tests on the individual orders.   FUNGITELL ASSAY FOR (1.3)-B-D-GLUCANS   ASPERGILLUS (GALACTOMANNAN) AG, SERUM   QUANTIFERON GOLD TB   PNEUMOCYSTIS JIROVEII BY PCR        ECG Results              EKG 12-lead (Final result)  Result time 12/27/23 11:52:10      Final result by Interface, Lab In [facility] (12/27/23 11:52:10)                   Narrative:    Test Reason : R06.02,    Vent. Rate : 077 BPM     Atrial Rate : 077 BPM     P-R Int : 130 ms          QRS Dur : 080 ms      QT Int : 388 ms       P-R-T Axes : 070 077 077 degrees     QTc Int : 439 ms    Normal sinus rhythm  Normal ECG  No previous ECGs available  Confirmed by Waylon Joseph MD ([id number]) on 12/27/2023 11:52:00 AM    Referred By: [email or web address]   SELF           Confirmed By:Waylon Joseph MD                                  Imaging Results              US Abdomen Limited (Final result)  Result time 12/27/23 17:05:19      Final result by Gibson March MD (12/27/23 17:05:19)                   Impression:      1. Cholelithiasis without sonographic evidence of acute cholecystitis.  2. No biliary ductal dilatation.      Electronically signed by: Gibson March  Date:    12/27/2023  Time:    17:05               Narrative:    EXAMINATION:  US ABDOMEN LIMITED    CLINICAL HISTORY:  elevated LFT;    COMPARISON:  CT earlier today.    FINDINGS:  Grayscale, color and spectral doppler  evaluation of the right upper quadrant.    The pancreas is obscured.  Imaged portions of aorta and IVC normal in caliber.    The liver is mildly enlarged.  No focal liver lesion is seen.  Normal hepatopetal flow is noted in the portal vein.    There are gallstones.  No significant gallbladder wall thickening or pericholecystic fluid.  The common bile duct is normal in caliber  and measures 2 mm.    Right kidney measures 12 cm in length. No hydronephrosis.                                       CT Chest Abdomen Pelvis With IV Contrast (XPD) NO Oral Contrast (Final result)  Result time 12/27/23 14:54:23      Final result by Gibson March MD (12/27/23 14:54:23)                   Impression:      1. Indeterminate enlarged left axillary and pectoral lymph nodes.  2. Left upper lobe opacities favored infectious or inflammatory.  Recommend a follow-up chest CT in 1-2 months.  3. No acute abdominopelvic findings.      Electronically signed by: Gibson March  Date:    12/27/2023  Time:    14:54               Narrative:    EXAMINATION:  CT CHEST ABDOMEN PELVIS WITH IV CONTRAST (XPD)    CLINICAL HISTORY:  productive cough, sob, RLQ abdominal pain;    TECHNIQUE:  Helical acquisition from the thoracic inlet through the ischia with  IV contrast. Three plane reconstructions made available for review.  mGycm. Automatic exposure control, adjustment of mA/kV or iterative reconstruction technique was used to reduce radiation.    COMPARISON:  None available.    FINDINGS:  Chest.    Heart is not enlarged.  There are coronary artery calcifications.    There is no mediastinal or hilar adenopathy.  There are enlarged left axillary and pectoral lymph nodes.  For example image 26 series 2 measures 14 mm short axis.    There is no pleural effusion.  Mild atelectasis or scarring towards the lung bases.  There is irregular left upper lobe consolidation medially.  There is associated bronchiectasis with debris in bronchi.  Possible  broncholiths image 31 series 2.  There is mild emphysema.    Abdomen and pelvis.    The liver is mildly enlarged.  There are gallstones.  No pericholecystic inflammation.  Spleen, pancreas and adrenals within normal limits.  No hydronephrosis or suspicious renal lesion.    There is no bowel obstruction.  Normal appendix.  Moderate stool in the colon.    Urinary bladder unremarkable.  No pelvic free fluid.  Abdominal aorta normal in caliber.  Moderate atherosclerotic disease.  Small fat containing right inguinal hernia.  No enlarged retroperitoneal lymph nodes.    There are mild degenerative changes of the spine.                                       X-Ray Chest PA And Lateral (Final result)  Result time 12/27/23 11:10:57      Final result by Varun Gomez MD (12/27/23 11:10:57)                   Impression:      No acute cardiopulmonary process.      Electronically signed by: Varun Gomez  Date:    12/27/2023  Time:    11:10               Narrative:    EXAMINATION:  XR CHEST PA AND LATERAL    CLINICAL HISTORY:  Shortness of breath    TECHNIQUE:  Two views of the chest    COMPARISON:  02/03/2020    FINDINGS:  No focal opacification, pleural effusion, or pneumothorax.    The cardiomediastinal silhouette is within normal limits.    No acute osseous abnormality.                                       Medications   cefTRIAXone (ROCEPHIN) 1 g in dextrose 5 % in water (D5W) 100 mL IVPB (MB+) (has no administration in time range)   azithromycin (ZITHROMAX) 500 mg in dextrose 5 % (D5W) 250 mL IVPB (has no administration in time range)   albuterol-ipratropium 2.5 mg-0.5 mg/3 mL nebulizer solution 3 mL (has no administration in time range)   dexAMETHasone injection 4 mg (has no administration in time range)   nicotine 21 mg/24 hr 1 patch (1 patch Transdermal Patch Applied 12/27/23 2049)   albuterol-ipratropium 2.5 mg-0.5 mg/3 mL nebulizer solution 3 mL (3 mLs Nebulization Given 12/27/23 1303)   dexAMETHasone injection 8  mg (8 mg Intravenous Given 12/27/23 1303)   iopamidoL (ISOVUE-370) injection 100 mL (100 mLs Intravenous Given 12/27/23 1419)   lactated ringers bolus 1,000 mL (0 mLs Intravenous Stopped 12/27/23 1615)   cefTRIAXone (ROCEPHIN) 1 g in dextrose 5 % in water (D5W) 100 mL IVPB (MB+) (0 g Intravenous Stopped 12/27/23 1620)   azithromycin 500 mg in dextrose 5 % 250 mL IVPB (ready to mix) (0 mg Intravenous Stopped 12/27/23 1729)   albuterol-ipratropium 2.5 mg-0.5 mg/3 mL nebulizer solution 3 mL (3 mLs Nebulization Given 12/27/23 1533)     Medical Decision Making  Amount and/or Complexity of Data Reviewed  Labs: ordered.  Radiology: ordered and independent interpretation performed.  ECG/medicine tests: ordered and independent interpretation performed.    Risk  Prescription drug management.  Decision regarding hospitalization.    Differential diagnosis (includes but is not limited to):   ACS, arrhythmia, electrolyte abnormalities, dehydration, kidney injury, PE, pneumothorax, pneumonia, sepsis, COPD, emphysema, CHF    MDM Narrative  58-year-old male presents for evaluation of generalized body aches, productive cough, chills, and fevers at home.  Patient is hypoxic and requiring supplemental oxygen.  COVID/flu swab pending.  Pain and nausea control as needed.  Infectious workup including blood cultures and lactic acid pending.  Chest x-ray pending.  EKG to be performed.  CT scan of the chest pending for further evaluation of the patient's symptoms.  Continue supplemental oxygen.    Update:  Labs reviewed.  Imaging reviewed consistent with a pneumonia.  Patient continues to require supplemental oxygen despite inhaled bronchodilators.  Antibiotic therapy ordered.  Hold off on full 30 cc/kilos IV fluid bolus given concern for volume overload.  Continue bronchodilators.  Continue antibiotics.  Case discussed with hospitalist, will admit.    Dispo:  Admit    My independent radiology interpretation:  As above  Point of care US  "(independently performed and interpreted):   Decision rules/clinical scoring:     Sepsis Perfusion Assessment: "I attest a sepsis perfusion exam was performed within 6 hours of sepsis, severe sepsis, or septic shock presentation, following fluid resuscitation."     Amount and/or Complexity of Data Reviewed  Independent historian: none   Summary of history:   External data reviewed: notes from previous admissions, notes from previous ED visits, and notes from clinic visits  Summary of data reviewed:  Prior records reviewed  Risk and benefits of testing: discussed   Labs: ordered and reviewed  Radiology: ordered and independent interpretation performed (see above or ED course)  ECG/medicine tests: ordered and independent interpretation performed (see above or ED course)  Discussion of management or test interpretation with external provider(s): discussed with hospitalist physician   Summary of discussion:  As above    Risk  Parenteral controlled substances   Drug therapy requiring intense monitoring for toxicity   Decision regarding hospitalization  Shared decision making     Critical Care  30-74 minutes     Data Reviewed/Counseling: I have personally reviewed the patient's vital signs, nursing notes, and other relevant tests, information, and imaging. I had a detailed discussion regarding the historical points, exam findings, and any diagnostic results supporting the discharge diagnosis. I personally performed the history, PE, MDM and procedures as documented above and agree with the scribe's documentation.    Portions of this note were dictated using voice recognition software. Although it was reviewed for accuracy, some inherent voice recognition errors may have occurred and may be present in this document.          Scribe Attestation:   Scribe #1: I performed the above scribed service and the documentation accurately describes the services I performed. I attest to the accuracy of the note.    Attending " Attestation:           Physician Attestation for Scribe:  Physician Attestation Statement for Scribe #1: I, Ephraim Baker MD, reviewed documentation, as scribed by Chela Contreras in my presence, and it is both accurate and complete.             ED Course as of 12/27/23 2239   Wed Dec 27, 2023   1400 EKG independently interpreted by me.  EKG: NSR @ 77, no STEMI, Qtc 439 [MC]   1400 X-Ray Chest PA And Lateral  Independently visualized/reviewed by me during the ED visit.  - No lobar consolidation, no PTX []   1544 Paged hospital medicine  [ED]   1551 Spoke with [facility] medicine  [ED]      ED Course User Index  [ED] Chela Contreras  [MC] Ephraim Baker MD                           Clinical Impression:  Final diagnoses:  [R06.02] SOB (shortness of breath)  [J18.9] Multifocal pneumonia (Primary)  [R09.02] Hypoxia          ED Disposition Condition    Admit Stable                Ephraim Baker MD  12/27/23 2239

## 2023-12-27 NOTE — FIRST PROVIDER EVALUATION
Medical screening examination initiated.  I have conducted a focused provider triage encounter, findings are as follows:    Brief history of present illness:  Patient is a 58-year-old male who presents to the emergency department with complaints of generalized body aches, cough, and sore throat intermittently x1 month.  He also reports subjective fever. Patient also reports chest pain and shortness of breath that started when he started working on his camper.    Vitals:    12/27/23 1034 12/27/23 1038   BP: 115/71    Pulse: 68    Resp: 20    Temp: 98.5 °F (36.9 °C)    TempSrc: Oral    SpO2: (!) 91% 98%   Weight: 81.6 kg (180 lb)        Pertinent physical exam:  Awake and alert, NAD.     Brief workup plan:  Labs, EKG, swabs, cxr     Preliminary workup initiated; this workup will be continued and followed by the physician or advanced practice provider that is assigned to the patient when roomed.

## 2023-12-28 VITALS
DIASTOLIC BLOOD PRESSURE: 75 MMHG | BODY MASS INDEX: 24.38 KG/M2 | SYSTOLIC BLOOD PRESSURE: 116 MMHG | HEART RATE: 71 BPM | TEMPERATURE: 98 F | WEIGHT: 180 LBS | RESPIRATION RATE: 18 BRPM | OXYGEN SATURATION: 91 % | HEIGHT: 72 IN

## 2023-12-28 LAB
ALBUMIN SERPL-MCNC: 3.3 G/DL (ref 3.5–5)
ALBUMIN/GLOB SERPL: 1 RATIO (ref 1.1–2)
ALP SERPL-CCNC: 56 UNIT/L (ref 40–150)
ALT SERPL-CCNC: 49 UNIT/L (ref 0–55)
AST SERPL-CCNC: 32 UNIT/L (ref 5–34)
BASOPHILS # BLD AUTO: 0.03 X10(3)/MCL
BASOPHILS NFR BLD AUTO: 0.3 %
BILIRUB SERPL-MCNC: 0.4 MG/DL
BUN SERPL-MCNC: 14.9 MG/DL (ref 8.4–25.7)
CALCIUM SERPL-MCNC: 8.9 MG/DL (ref 8.4–10.2)
CHLORIDE SERPL-SCNC: 109 MMOL/L (ref 98–107)
CO2 SERPL-SCNC: 25 MMOL/L (ref 22–29)
CREAT SERPL-MCNC: 0.93 MG/DL (ref 0.73–1.18)
EOSINOPHIL # BLD AUTO: 0 X10(3)/MCL (ref 0–0.9)
EOSINOPHIL NFR BLD AUTO: 0 %
ERYTHROCYTE [DISTWIDTH] IN BLOOD BY AUTOMATED COUNT: 13.2 % (ref 11.5–17)
GFR SERPLBLD CREATININE-BSD FMLA CKD-EPI: >60 MLS/MIN/1.73/M2
GLOBULIN SER-MCNC: 3.4 GM/DL (ref 2.4–3.5)
GLUCOSE SERPL-MCNC: 174 MG/DL (ref 74–100)
HCT VFR BLD AUTO: 44.8 % (ref 42–52)
HGB BLD-MCNC: 14.8 G/DL (ref 14–18)
IMM GRANULOCYTES # BLD AUTO: 0.03 X10(3)/MCL (ref 0–0.04)
IMM GRANULOCYTES NFR BLD AUTO: 0.3 %
LYMPHOCYTES # BLD AUTO: 2.01 X10(3)/MCL (ref 0.6–4.6)
LYMPHOCYTES NFR BLD AUTO: 19.4 %
MCH RBC QN AUTO: 30.8 PG (ref 27–31)
MCHC RBC AUTO-ENTMCNC: 33 G/DL (ref 33–36)
MCV RBC AUTO: 93.3 FL (ref 80–94)
MONOCYTES # BLD AUTO: 0.63 X10(3)/MCL (ref 0.1–1.3)
MONOCYTES NFR BLD AUTO: 6.1 %
NEUTROPHILS # BLD AUTO: 7.64 X10(3)/MCL (ref 2.1–9.2)
NEUTROPHILS NFR BLD AUTO: 73.9 %
NRBC BLD AUTO-RTO: 0 %
PLATELET # BLD AUTO: 300 X10(3)/MCL (ref 130–400)
PMV BLD AUTO: 9.3 FL (ref 7.4–10.4)
POTASSIUM SERPL-SCNC: 4.2 MMOL/L (ref 3.5–5.1)
PROT SERPL-MCNC: 6.7 GM/DL (ref 6.4–8.3)
RBC # BLD AUTO: 4.8 X10(6)/MCL (ref 4.7–6.1)
SODIUM SERPL-SCNC: 142 MMOL/L (ref 136–145)
WBC # SPEC AUTO: 10.34 X10(3)/MCL (ref 4.5–11.5)

## 2023-12-28 PROCEDURE — 80053 COMPREHEN METABOLIC PANEL: CPT | Performed by: NURSE PRACTITIONER

## 2023-12-28 PROCEDURE — 85025 COMPLETE CBC W/AUTO DIFF WBC: CPT | Performed by: NURSE PRACTITIONER

## 2023-12-28 NOTE — DISCHARGE SUMMARY
[facility] Hospital Medicine Discharge Summary    Admit Date: 12/27/2023  Discharge Date and Time: 12/28/20239:07 AM  Admitting Physician: WILIAM Team  Discharging Physician: Wenceslao Moran MD.  Primary Care Physician: Geri, Primary Doctor  Consults: None    Discharge Diagnoses:  Left upper lobe irregular consolidation medially -R/o TB or any infectious etiology  Hx: TB?     Hospital Course:    58 y.o. male with a PMHx of tobacco abuse who presented to [facility] on 12/27/2023 with c/o generalized body aches x4 days.  He also endorsed subjective fevers, chills, abdominal pain, nausea, vomiting, cough, sore throat that has been intermittent over the past 1 month along with SOB.  He reports that his home has mold present in his home that he was remodeling.     Upon presentation to ED, vital signed included /71, HR 68, RR 20, SpO2 91% on room air, temperature 98.5° F. He was subsequently placed on supplemental oxygen at 2 L via nasal cannula.  Labs were notable for glucose 139, AST 45, ALT 60.  BNP normal.  Troponin undetectable.  Influenza, Strep A, and COVID-19 negative.  Blood cultures x2 obtained.  CXR negative for acute cardiopulmonary process.  CT chest abdomen and pelvis with IV contrast demonstrated indeterminate enlarged left axillary and pectoral lymph nodes, left upper lobe opacities favored infectious or inflammatory; also noted associated bronchiectasis and mild emphysema.  He was started on IV ceftriaxone and azithromycin.  He required duo nebs x2 and was also given IV Decadron in ED. admitted to hospital medicine services for further medical management.    Work up sent     Per chart review, pt eloped on 12/28/23 5:43 AM    Pt not seen on discharge day  Vitals:  VITAL SIGNS: 24 HRS MIN & MAX LAST   Temp  Min: 97.7 °F (36.5 °C)  Max: 98.5 °F (36.9 °C) 97.7 °F (36.5 °C)   BP  Min: 102/68  Max: 132/71 116/75   Pulse  Min: 66  Max: 98  71   Resp  Min: 12  Max: 20 18   SpO2  Min:  87 %  Max: 100 % (!) 91 %       Physical Exam:  Pt eloped 12/28/23 5:43 AM     Procedures Performed: No admission procedures for hospital encounter.     Significant Diagnostic Studies: See Full reports for all details    Recent Labs   Lab 12/27/23  1112 12/28/23  0445   WBC 7.51 10.34   RBC 5.16 4.80   HGB 15.7 14.8   HCT 48.9 44.8   MCV 94.8* 93.3   MCH 30.4 30.8   MCHC 32.1* 33.0   RDW 13.3 13.2    300   MPV 9.0 9.3       Recent Labs   Lab 12/27/23  1112 12/28/23  0445    142   K 4.2 4.2   CO2 28 25   BUN 13.1 14.9   CREATININE 0.95 0.93   CALCIUM 9.0 8.9   ALBUMIN 3.6 3.3*   ALKPHOS 70 56   ALT 60* 49   AST 45* 32   BILITOT 0.4 0.4        Microbiology Results (last 7 days)       Procedure Component Value Units Date/Time    AFB Smear [[id number]]     Order Status: Canceled Specimen: Sputum, Expectorated     Respiratory Culture [[id number]]     Order Status: Canceled Specimen: Sputum, Expectorated     Blood Culture #1 **CANNOT BE ORDERED STAT** [[id number]] Collected: 12/27/23 1536    Order Status: Resulted Specimen: Blood Updated: 12/27/23 1548    Blood Culture #2 **CANNOT BE ORDERED STAT** [[id number]] Collected: 12/27/23 1536    Order Status: Resulted Specimen: Blood Updated: 12/27/23 1547             US Abdomen Limited  Narrative: EXAMINATION:  US ABDOMEN LIMITED    CLINICAL HISTORY:  elevated LFT;    COMPARISON:  CT earlier today.    FINDINGS:  Grayscale, color and spectral doppler evaluation of the right upper quadrant.    The pancreas is obscured.  Imaged portions of aorta and IVC normal in caliber.    The liver is mildly enlarged.  No focal liver lesion is seen.  Normal hepatopetal flow is noted in the portal vein.    There are gallstones.  No significant gallbladder wall thickening or pericholecystic fluid.  The common bile duct is normal in caliber  and measures 2 mm.    Right kidney measures 12 cm in length. No hydronephrosis.  Impression: 1. Cholelithiasis without sonographic evidence of  acute cholecystitis.  2. No biliary ductal dilatation.    Electronically signed by: Gibson March  Date:    12/27/2023  Time:    17:05  CT Chest Abdomen Pelvis With IV Contrast (XPD) NO Oral Contrast  Narrative: EXAMINATION:  CT CHEST ABDOMEN PELVIS WITH IV CONTRAST (XPD)    CLINICAL HISTORY:  productive cough, sob, RLQ abdominal pain;    TECHNIQUE:  Helical acquisition from the thoracic inlet through the ischia with  IV contrast. Three plane reconstructions made available for review.  mGycm. Automatic exposure control, adjustment of mA/kV or iterative reconstruction technique was used to reduce radiation.    COMPARISON:  None available.    FINDINGS:  Chest.    Heart is not enlarged.  There are coronary artery calcifications.    There is no mediastinal or hilar adenopathy.  There are enlarged left axillary and pectoral lymph nodes.  For example image 26 series 2 measures 14 mm short axis.    There is no pleural effusion.  Mild atelectasis or scarring towards the lung bases.  There is irregular left upper lobe consolidation medially.  There is associated bronchiectasis with debris in bronchi.  Possible broncholiths image 31 series 2.  There is mild emphysema.    Abdomen and pelvis.    The liver is mildly enlarged.  There are gallstones.  No pericholecystic inflammation.  Spleen, pancreas and adrenals within normal limits.  No hydronephrosis or suspicious renal lesion.    There is no bowel obstruction.  Normal appendix.  Moderate stool in the colon.    Urinary bladder unremarkable.  No pelvic free fluid.  Abdominal aorta normal in caliber.  Moderate atherosclerotic disease.  Small fat containing right inguinal hernia.  No enlarged retroperitoneal lymph nodes.    There are mild degenerative changes of the spine.  Impression: 1. Indeterminate enlarged left axillary and pectoral lymph nodes.  2. Left upper lobe opacities favored infectious or inflammatory.  Recommend a follow-up chest CT in 1-2 months.  3. No acute  abdominopelvic findings.    Electronically signed by: Gibson March  Date:    12/27/2023  Time:    14:54  X-Ray Chest PA And Lateral  Narrative: EXAMINATION:  XR CHEST PA AND LATERAL    CLINICAL HISTORY:  Shortness of breath    TECHNIQUE:  Two views of the chest    COMPARISON:  02/03/2020    FINDINGS:  No focal opacification, pleural effusion, or pneumothorax.    The cardiomediastinal silhouette is within normal limits.    No acute osseous abnormality.  Impression: No acute cardiopulmonary process.    Electronically signed by: Varun Gomez  Date:    12/27/2023  Time:    11:10         Medication List      You have not been prescribed any medications.          Explained in detail to the patient about the discharge plan, medications, and follow-up visits. Pt understands and agrees with the treatment plan  Discharge Disposition: Eloped   Discharged Condition: stable  Diet-    Medications Per DC med rec  Activities as tolerated    For further questions contact hospitalist office    Discharge time 5 minutes          Wenceslao Avilez M.D on 12/28/2023. at 9:07 AM.

## 2023-12-29 LAB
GALACTOMANNAN AG SERPL IA-ACNC: <0.5 INDEX
GAMMA INTERFERON BACKGROUND BLD IA-ACNC: 0.02 IU/ML
M TB IFN-G BLD-IMP: POSITIVE
M TB IFN-G CD4+ BCKGRND COR BLD-ACNC: 2.36 IU/ML
M TB IFN-G CD4+CD8+ BCKGRND COR BLD-ACNC: 2.46 IU/ML
MITOGEN IGNF BCKGRD COR BLD-ACNC: 9.02 IU/ML

## 2023-12-30 LAB
1,3 BETA GLUCAN SER QL: NEGATIVE
1,3 BETA GLUCAN SER-MCNC: <31 PG/ML

## 2024-01-01 LAB
BACTERIA BLD CULT: NORMAL
BACTERIA BLD CULT: NORMAL

## 2024-04-15 ENCOUNTER — HOSPITAL ENCOUNTER (INPATIENT)
Facility: HOSPITAL | Age: 59
LOS: 2 days | Discharge: HOME | DRG: 193 | End: 2024-04-17
Attending: EMERGENCY MEDICINE | Admitting: INTERNAL MEDICINE
Payer: MEDICAID

## 2024-04-15 DIAGNOSIS — J43.8 OTHER EMPHYSEMA: ICD-10-CM

## 2024-04-15 DIAGNOSIS — R09.02 HYPOXIA: ICD-10-CM

## 2024-04-15 DIAGNOSIS — F17.200 CURRENT EVERY DAY SMOKER: ICD-10-CM

## 2024-04-15 DIAGNOSIS — R07.9 CHEST PAIN: ICD-10-CM

## 2024-04-15 DIAGNOSIS — J18.9 MULTIFOCAL PNEUMONIA: ICD-10-CM

## 2024-04-15 LAB
ALBUMIN SERPL-MCNC: 2.9 G/DL (ref 3.5–5)
ALBUMIN/GLOB SERPL: 0.6 RATIO (ref 1.1–2)
ALLENS TEST BLOOD GAS (OHS): YES
ALP SERPL-CCNC: 59 UNIT/L (ref 40–150)
ALT SERPL-CCNC: 15 UNIT/L (ref 0–55)
AST SERPL-CCNC: 22 UNIT/L (ref 5–34)
B PERT.PT PRMT NPH QL NAA+NON-PROBE: NOT DETECTED
BASE EXCESS BLD CALC-SCNC: 2.4 MMOL/L (ref -2–2)
BASOPHILS # BLD AUTO: 0.06 X10(3)/MCL
BASOPHILS NFR BLD AUTO: 0.6 %
BILIRUB SERPL-MCNC: 0.5 MG/DL
BLOOD GAS SAMPLE TYPE (OHS): ABNORMAL
BNP BLD-MCNC: <10 PG/ML
BUN SERPL-MCNC: 11.3 MG/DL (ref 8.4–25.7)
C PNEUM DNA NPH QL NAA+NON-PROBE: NOT DETECTED
CA-I BLD-SCNC: 1.11 MMOL/L (ref 1.12–1.23)
CALCIUM SERPL-MCNC: 9.2 MG/DL (ref 8.4–10.2)
CHLORIDE SERPL-SCNC: 105 MMOL/L (ref 98–107)
CO2 BLDA-SCNC: 28.5 MMOL/L
CO2 SERPL-SCNC: 26 MMOL/L (ref 22–29)
COHGB MFR BLDA: 6.3 % (ref 0.5–1.5)
CREAT SERPL-MCNC: 0.77 MG/DL (ref 0.73–1.18)
D DIMER PPP IA.FEU-MCNC: 1.19 UG/ML FEU (ref 0–0.5)
DRAWN BY BLOOD GAS (OHS): ABNORMAL
EOSINOPHIL # BLD AUTO: 0.38 X10(3)/MCL (ref 0–0.9)
EOSINOPHIL NFR BLD AUTO: 3.5 %
ERYTHROCYTE [DISTWIDTH] IN BLOOD BY AUTOMATED COUNT: 13.2 % (ref 11.5–17)
FLUAV AG UPPER RESP QL IA.RAPID: NOT DETECTED
FLUBV AG UPPER RESP QL IA.RAPID: NOT DETECTED
GFR SERPLBLD CREATININE-BSD FMLA CKD-EPI: >60 MLS/MIN/1.73/M2
GLOBULIN SER-MCNC: 4.7 GM/DL (ref 2.4–3.5)
GLUCOSE SERPL-MCNC: 101 MG/DL (ref 74–100)
HADV DNA NPH QL NAA+NON-PROBE: NOT DETECTED
HCO3 BLDA-SCNC: 27.2 MMOL/L (ref 22–26)
HCOV 229E RNA NPH QL NAA+NON-PROBE: NOT DETECTED
HCOV HKU1 RNA NPH QL NAA+NON-PROBE: NOT DETECTED
HCOV NL63 RNA NPH QL NAA+NON-PROBE: NOT DETECTED
HCOV OC43 RNA NPH QL NAA+NON-PROBE: NOT DETECTED
HCT VFR BLD AUTO: 46 % (ref 42–52)
HGB BLD-MCNC: 15.3 G/DL (ref 14–18)
HMPV RNA NPH QL NAA+NON-PROBE: NOT DETECTED
HPIV1 RNA NPH QL NAA+NON-PROBE: NOT DETECTED
HPIV2 RNA NPH QL NAA+NON-PROBE: NOT DETECTED
HPIV3 RNA NPH QL NAA+NON-PROBE: NOT DETECTED
HPIV4 RNA NPH QL NAA+NON-PROBE: NOT DETECTED
IMM GRANULOCYTES # BLD AUTO: 0.04 X10(3)/MCL (ref 0–0.04)
IMM GRANULOCYTES NFR BLD AUTO: 0.4 %
INR PPP: 1.2
LACTATE SERPL-SCNC: 1 MMOL/L (ref 0.5–2.2)
LPM (OHS): 2
LYMPHOCYTES # BLD AUTO: 2.51 X10(3)/MCL (ref 0.6–4.6)
LYMPHOCYTES NFR BLD AUTO: 23.1 %
M PNEUMO DNA NPH QL NAA+NON-PROBE: NOT DETECTED
MCH RBC QN AUTO: 30.6 PG (ref 27–31)
MCHC RBC AUTO-ENTMCNC: 33.3 G/DL (ref 33–36)
MCV RBC AUTO: 92 FL (ref 80–94)
METHGB MFR BLDA: 0.7 % (ref 0.4–1.5)
MONOCYTES # BLD AUTO: 0.9 X10(3)/MCL (ref 0.1–1.3)
MONOCYTES NFR BLD AUTO: 8.3 %
MRSA PCR SCRN (OHS): DETECTED
NEUTROPHILS # BLD AUTO: 6.96 X10(3)/MCL (ref 2.1–9.2)
NEUTROPHILS NFR BLD AUTO: 64.1 %
NRBC BLD AUTO-RTO: 0 %
O2 HB BLOOD GAS (OHS): 88.2 % (ref 94–97)
OHS QRS DURATION: 88 MS
OHS QTC CALCULATION: 430 MS
OXYGEN DEVICE BLOOD GAS (OHS): ABNORMAL
OXYHGB MFR BLDA: 14.7 G/DL (ref 12–16)
PCO2 BLDA: 42 MMHG (ref 35–45)
PH BLDA: 7.42 [PH] (ref 7.35–7.45)
PLATELET # BLD AUTO: 391 X10(3)/MCL (ref 130–400)
PMV BLD AUTO: 9.4 FL (ref 7.4–10.4)
PO2 BLDA: 64 MMHG (ref 80–100)
POTASSIUM BLOOD GAS (OHS): 3.5 MMOL/L (ref 3.5–5)
POTASSIUM SERPL-SCNC: 3.7 MMOL/L (ref 3.5–5.1)
PROT SERPL-MCNC: 7.6 GM/DL (ref 6.4–8.3)
PROTHROMBIN TIME: 14.8 SECONDS (ref 12.5–14.5)
RBC # BLD AUTO: 5 X10(6)/MCL (ref 4.7–6.1)
RSV A 5' UTR RNA NPH QL NAA+PROBE: NOT DETECTED
RSV RNA NPH QL NAA+NON-PROBE: NOT DETECTED
RV+EV RNA NPH QL NAA+NON-PROBE: DETECTED
SAMPLE SITE BLOOD GAS (OHS): ABNORMAL
SAO2 % BLDA: 92.5 %
SARS-COV-2 RNA RESP QL NAA+PROBE: NOT DETECTED
SODIUM BLOOD GAS (OHS): 135 MMOL/L (ref 137–145)
SODIUM SERPL-SCNC: 141 MMOL/L (ref 136–145)
TROPONIN I SERPL-MCNC: <0.01 NG/ML (ref 0–0.04)
WBC # SPEC AUTO: 10.85 X10(3)/MCL (ref 4.5–11.5)

## 2024-04-15 PROCEDURE — 96365 THER/PROPH/DIAG IV INF INIT: CPT

## 2024-04-15 PROCEDURE — 96367 TX/PROPH/DG ADDL SEQ IV INF: CPT

## 2024-04-15 PROCEDURE — 85610 PROTHROMBIN TIME: CPT | Performed by: EMERGENCY MEDICINE

## 2024-04-15 PROCEDURE — S4991 NICOTINE PATCH NONLEGEND: HCPCS | Performed by: PHYSICIAN ASSISTANT

## 2024-04-15 PROCEDURE — 85379 FIBRIN DEGRADATION QUANT: CPT | Performed by: EMERGENCY MEDICINE

## 2024-04-15 PROCEDURE — 82803 BLOOD GASES ANY COMBINATION: CPT

## 2024-04-15 PROCEDURE — 0241U COVID/RSV/FLU A&B PCR: CPT | Performed by: EMERGENCY MEDICINE

## 2024-04-15 PROCEDURE — 87040 BLOOD CULTURE FOR BACTERIA: CPT | Performed by: EMERGENCY MEDICINE

## 2024-04-15 PROCEDURE — 27000221 HC OXYGEN, UP TO 24 HOURS

## 2024-04-15 PROCEDURE — 25000003 PHARM REV CODE 250: Performed by: INTERNAL MEDICINE

## 2024-04-15 PROCEDURE — 99285 EMERGENCY DEPT VISIT HI MDM: CPT | Mod: 25

## 2024-04-15 PROCEDURE — 25000242 PHARM REV CODE 250 ALT 637 W/ HCPCS: Performed by: PHYSICIAN ASSISTANT

## 2024-04-15 PROCEDURE — 83880 ASSAY OF NATRIURETIC PEPTIDE: CPT | Performed by: EMERGENCY MEDICINE

## 2024-04-15 PROCEDURE — 99900035 HC TECH TIME PER 15 MIN (STAT)

## 2024-04-15 PROCEDURE — 84484 ASSAY OF TROPONIN QUANT: CPT | Performed by: EMERGENCY MEDICINE

## 2024-04-15 PROCEDURE — 83605 ASSAY OF LACTIC ACID: CPT | Performed by: EMERGENCY MEDICINE

## 2024-04-15 PROCEDURE — 99900031 HC PATIENT EDUCATION (STAT)

## 2024-04-15 PROCEDURE — 93005 ELECTROCARDIOGRAM TRACING: CPT

## 2024-04-15 PROCEDURE — 36600 WITHDRAWAL OF ARTERIAL BLOOD: CPT

## 2024-04-15 PROCEDURE — 63600175 PHARM REV CODE 636 W HCPCS: Performed by: EMERGENCY MEDICINE

## 2024-04-15 PROCEDURE — 87798 DETECT AGENT NOS DNA AMP: CPT | Performed by: EMERGENCY MEDICINE

## 2024-04-15 PROCEDURE — 11000001 HC ACUTE MED/SURG PRIVATE ROOM

## 2024-04-15 PROCEDURE — 80053 COMPREHEN METABOLIC PANEL: CPT | Performed by: EMERGENCY MEDICINE

## 2024-04-15 PROCEDURE — 25500020 PHARM REV CODE 255: Performed by: EMERGENCY MEDICINE

## 2024-04-15 PROCEDURE — 87070 CULTURE OTHR SPECIMN AEROBIC: CPT | Performed by: PHYSICIAN ASSISTANT

## 2024-04-15 PROCEDURE — 85025 COMPLETE CBC W/AUTO DIFF WBC: CPT | Performed by: EMERGENCY MEDICINE

## 2024-04-15 PROCEDURE — 21400001 HC TELEMETRY ROOM

## 2024-04-15 PROCEDURE — 94760 N-INVAS EAR/PLS OXIMETRY 1: CPT | Mod: XB

## 2024-04-15 PROCEDURE — 94640 AIRWAY INHALATION TREATMENT: CPT

## 2024-04-15 PROCEDURE — 25000242 PHARM REV CODE 250 ALT 637 W/ HCPCS: Performed by: EMERGENCY MEDICINE

## 2024-04-15 PROCEDURE — 63600175 PHARM REV CODE 636 W HCPCS: Performed by: PHYSICIAN ASSISTANT

## 2024-04-15 PROCEDURE — 93010 ELECTROCARDIOGRAM REPORT: CPT | Mod: ,,, | Performed by: INTERNAL MEDICINE

## 2024-04-15 PROCEDURE — 25000003 PHARM REV CODE 250: Performed by: EMERGENCY MEDICINE

## 2024-04-15 PROCEDURE — 87641 MR-STAPH DNA AMP PROBE: CPT | Performed by: PHYSICIAN ASSISTANT

## 2024-04-15 PROCEDURE — 25000003 PHARM REV CODE 250: Performed by: PHYSICIAN ASSISTANT

## 2024-04-15 PROCEDURE — 96375 TX/PRO/DX INJ NEW DRUG ADDON: CPT

## 2024-04-15 RX ORDER — IBUPROFEN 200 MG
24 TABLET ORAL
Status: DISCONTINUED | OUTPATIENT
Start: 2024-04-15 | End: 2024-04-17 | Stop reason: HOSPADM

## 2024-04-15 RX ORDER — DEXAMETHASONE SODIUM PHOSPHATE 4 MG/ML
8 INJECTION, SOLUTION INTRA-ARTICULAR; INTRALESIONAL; INTRAMUSCULAR; INTRAVENOUS; SOFT TISSUE
Status: COMPLETED | OUTPATIENT
Start: 2024-04-15 | End: 2024-04-15

## 2024-04-15 RX ORDER — GUAIFENESIN AND DEXTROMETHORPHAN HYDROBROMIDE 10; 100 MG/5ML; MG/5ML
10 SYRUP ORAL EVERY 4 HOURS
Status: DISCONTINUED | OUTPATIENT
Start: 2024-04-15 | End: 2024-04-15

## 2024-04-15 RX ORDER — ONDANSETRON HYDROCHLORIDE 2 MG/ML
4 INJECTION, SOLUTION INTRAVENOUS EVERY 8 HOURS PRN
Status: DISCONTINUED | OUTPATIENT
Start: 2024-04-15 | End: 2024-04-17 | Stop reason: HOSPADM

## 2024-04-15 RX ORDER — IBUPROFEN 200 MG
16 TABLET ORAL
Status: DISCONTINUED | OUTPATIENT
Start: 2024-04-15 | End: 2024-04-17 | Stop reason: HOSPADM

## 2024-04-15 RX ORDER — IPRATROPIUM BROMIDE AND ALBUTEROL SULFATE 2.5; .5 MG/3ML; MG/3ML
3 SOLUTION RESPIRATORY (INHALATION)
Status: DISCONTINUED | OUTPATIENT
Start: 2024-04-15 | End: 2024-04-17 | Stop reason: HOSPADM

## 2024-04-15 RX ORDER — ACETAMINOPHEN 325 MG/1
650 TABLET ORAL EVERY 4 HOURS PRN
Status: DISCONTINUED | OUTPATIENT
Start: 2024-04-15 | End: 2024-04-17 | Stop reason: HOSPADM

## 2024-04-15 RX ORDER — ENOXAPARIN SODIUM 100 MG/ML
40 INJECTION SUBCUTANEOUS EVERY 24 HOURS
Status: DISCONTINUED | OUTPATIENT
Start: 2024-04-15 | End: 2024-04-17 | Stop reason: HOSPADM

## 2024-04-15 RX ORDER — ALBUTEROL SULFATE 0.83 MG/ML
10 SOLUTION RESPIRATORY (INHALATION)
Status: COMPLETED | OUTPATIENT
Start: 2024-04-15 | End: 2024-04-15

## 2024-04-15 RX ORDER — BENZONATATE 100 MG/1
200 CAPSULE ORAL 3 TIMES DAILY PRN
Status: DISCONTINUED | OUTPATIENT
Start: 2024-04-15 | End: 2024-04-15

## 2024-04-15 RX ORDER — BENZONATATE 100 MG/1
200 CAPSULE ORAL 3 TIMES DAILY
Status: DISCONTINUED | OUTPATIENT
Start: 2024-04-15 | End: 2024-04-17 | Stop reason: HOSPADM

## 2024-04-15 RX ORDER — GUAIFENESIN AND DEXTROMETHORPHAN HYDROBROMIDE 10; 100 MG/5ML; MG/5ML
10 SYRUP ORAL EVERY 4 HOURS
Status: DISCONTINUED | OUTPATIENT
Start: 2024-04-15 | End: 2024-04-17 | Stop reason: HOSPADM

## 2024-04-15 RX ORDER — GLUCAGON 1 MG
1 KIT INJECTION
Status: DISCONTINUED | OUTPATIENT
Start: 2024-04-15 | End: 2024-04-17 | Stop reason: HOSPADM

## 2024-04-15 RX ORDER — IBUPROFEN 200 MG
1 TABLET ORAL DAILY
Status: DISCONTINUED | OUTPATIENT
Start: 2024-04-15 | End: 2024-04-17 | Stop reason: HOSPADM

## 2024-04-15 RX ORDER — GUAIFENESIN 600 MG/1
600 TABLET, EXTENDED RELEASE ORAL 2 TIMES DAILY
Status: DISCONTINUED | OUTPATIENT
Start: 2024-04-15 | End: 2024-04-15

## 2024-04-15 RX ORDER — ACETAMINOPHEN 325 MG/1
650 TABLET ORAL EVERY 8 HOURS PRN
Status: DISCONTINUED | OUTPATIENT
Start: 2024-04-15 | End: 2024-04-17 | Stop reason: HOSPADM

## 2024-04-15 RX ADMIN — IPRATROPIUM BROMIDE AND ALBUTEROL SULFATE 3 ML: .5; 3 SOLUTION RESPIRATORY (INHALATION) at 01:04

## 2024-04-15 RX ADMIN — CEFTRIAXONE SODIUM 1 G: 1 INJECTION, POWDER, FOR SOLUTION INTRAMUSCULAR; INTRAVENOUS at 06:04

## 2024-04-15 RX ADMIN — ENOXAPARIN SODIUM 40 MG: 40 INJECTION SUBCUTANEOUS at 04:04

## 2024-04-15 RX ADMIN — NICOTINE 1 PATCH: 21 PATCH, EXTENDED RELEASE TRANSDERMAL at 09:04

## 2024-04-15 RX ADMIN — BENZONATATE 200 MG: 100 CAPSULE ORAL at 09:04

## 2024-04-15 RX ADMIN — GUAIFENESIN AND DEXTROMETHORPHAN 10 ML: 100; 10 SYRUP ORAL at 06:04

## 2024-04-15 RX ADMIN — BENZONATATE 200 MG: 100 CAPSULE ORAL at 11:04

## 2024-04-15 RX ADMIN — IPRATROPIUM BROMIDE AND ALBUTEROL SULFATE 3 ML: .5; 3 SOLUTION RESPIRATORY (INHALATION) at 07:04

## 2024-04-15 RX ADMIN — GUAIFENESIN AND DEXTROMETHORPHAN 10 ML: 100; 10 SYRUP ORAL at 11:04

## 2024-04-15 RX ADMIN — DEXAMETHASONE SODIUM PHOSPHATE 8 MG: 4 INJECTION, SOLUTION INTRA-ARTICULAR; INTRALESIONAL; INTRAMUSCULAR; INTRAVENOUS; SOFT TISSUE at 05:04

## 2024-04-15 RX ADMIN — GUAIFENESIN AND DEXTROMETHORPHAN 10 ML: 100; 10 SYRUP ORAL at 03:04

## 2024-04-15 RX ADMIN — AZITHROMYCIN MONOHYDRATE 500 MG: 500 INJECTION, POWDER, LYOPHILIZED, FOR SOLUTION INTRAVENOUS at 07:04

## 2024-04-15 RX ADMIN — IOHEXOL 100 ML: 350 INJECTION, SOLUTION INTRAVENOUS at 06:04

## 2024-04-15 RX ADMIN — ALBUTEROL SULFATE 10 MG: 2.5 SOLUTION RESPIRATORY (INHALATION) at 06:04

## 2024-04-15 RX ADMIN — BENZONATATE 200 MG: 100 CAPSULE ORAL at 03:04

## 2024-04-15 NOTE — ED PROVIDER NOTES
"Encounter Date: 4/15/2024       History     Chief Complaint   Patient presents with    Cough     Wants to be checked for "black mold". States coughing for 12 days, feels like his lungs are "full of fluid". C/o cold sweats, chest pain, sob. Wife here for similar complaints "but I'm older so I'm sicker". "Took medicine for TB over 20years ago but I dont know if I really had it. I have Hepatitis C"     58-year-old male with a history of chronic smoking, limited interaction with the medical system, does not do last time he saw a physician for a checkup, not currently prescribed any medications, presents to the emergency department for evaluation of productive cough, subjective fever, chills, worsening shortness of breath including exertional dyspnea and limitation of exercise tolerance for the last 10-12 days.  Wife with similar, milder symptoms.  Reports midline chest discomfort    The history is provided by the patient.     Review of patient's allergies indicates:  No Known Allergies  Past Medical History:   Diagnosis Date    Pneumonia 04/17/2024     No past surgical history on file.  No family history on file.     Review of Systems   Constitutional:  Positive for chills, fatigue and fever.   Respiratory:  Positive for cough, chest tightness and shortness of breath.    Cardiovascular:  Positive for chest pain.   Gastrointestinal:  Negative for abdominal pain, nausea and vomiting.       Physical Exam     Initial Vitals [04/15/24 0426]   BP Pulse Resp Temp SpO2   100/69 87 19 98.5 °F (36.9 °C) (!) 90 %      MAP       --         Physical Exam    Nursing note and vitals reviewed.  Constitutional: He appears well-developed and well-nourished.   HENT:   Head: Normocephalic and atraumatic.   Mouth/Throat: Oropharynx is clear and moist.   Eyes: No scleral icterus.   Cardiovascular:  Normal rate and regular rhythm.           Pulmonary/Chest:   Diminished, tachypneic, labored   Abdominal: Abdomen is soft. He exhibits no " distension. There is no abdominal tenderness.   Musculoskeletal:         General: No edema.     Neurological: He is alert and oriented to person, place, and time. GCS score is 15. GCS eye subscore is 4. GCS verbal subscore is 5. GCS motor subscore is 6.   Skin: Skin is warm and dry.         ED Course   Procedures  Labs Reviewed   COMPREHENSIVE METABOLIC PANEL - Abnormal; Notable for the following components:       Result Value    Glucose Level 101 (*)     Albumin Level 2.9 (*)     Globulin 4.7 (*)     Albumin/Globulin Ratio 0.6 (*)     All other components within normal limits   PROTIME-INR - Abnormal; Notable for the following components:    PT 14.8 (*)     All other components within normal limits   RESPIRATORY PANEL - Abnormal; Notable for the following components:    Human Rhinovirus/Enterovirus Detected (*)     All other components within normal limits    Narrative:     The BioFire Respiratory Panel 2.1 (RP2.1) is a PCR-based multiplexed nucleic acid test intended for use with the BioFire® 2.0 for simultaneous qualitative detection and identification of multiple respiratory viral and bacterial nucleic acids in nasopharyngeal swabs (NPS) obtained from individuals suspected of respiratory tract infections.   BLOOD GAS - Abnormal; Notable for the following components:    pO2, Blood gas 64.0 (*)     Sodium, Blood Gas 135 (*)     Calcium Level Ionized 1.11 (*)     Base Excess, Blood gas 2.40 (*)     HCO3, Blood gas 27.2 (*)     O2 Hb, Blood Gas 88.2 (*)     CO Hgb 6.3 (*)     All other components within normal limits   D DIMER, QUANTITATIVE - Abnormal; Notable for the following components:    D-Dimer 1.19 (*)     All other components within normal limits   MRSA PCR - Abnormal; Notable for the following components:    MRSA PCR SCRN ([facility]) Detected (*)     All other components within normal limits    Narrative:     The Xpert MRSA Assay utilizes automated real-time polymerase chain reaction (PCR) to detect MRSA DNA.  A  positive test result does not necessarily indicate the presence of viable organism.  It is however, presumptive for the presence of MRSA.   B-TYPE NATRIURETIC PEPTIDE - Normal   TROPONIN I - Normal   COVID/RSV/FLU A&B PCR - Normal    Narrative:     The Xpert Xpress SARS-CoV-2/FLU/RSV plus is a rapid, multiplexed real-time PCR test intended for the simultaneous qualitative detection and differentiation of SARS-CoV-2, Influenza A, Influenza B, and respiratory syncytial virus (RSV) viral RNA in either nasopharyngeal swab or nasal swab specimens.         LACTIC ACID, PLASMA - Normal   CBC W/ AUTO DIFFERENTIAL    Narrative:     The following orders were created for panel order CBC auto differential.  Procedure                               Abnormality         Status                     ---------                               -----------         ------                     CBC with Differential[[id number]]                           Final result                 Please view results for these tests on the individual orders.   CBC WITH DIFFERENTIAL     EKG Readings: (Independently Interpreted)   Initial Reading: No STEMI. Rhythm: Normal Sinus Rhythm. Heart Rate: 77. Ectopy: No Ectopy. ST Segments: Normal ST Segments. T Waves: Normal. Axis: Normal. Clinical Impression: Normal Sinus Rhythm   04/15/2024 @ 0425     ECG Results              EKG 12-lead (Final result)        Collection Time Result Time QRS Duration [facility] QTC Calculation    04/15/24 04:25:30 04/15/24 07:34:19 88 430                     Final result by Interface, [facility] (04/15/24 07:34:31)                   Narrative:    Test Reason : R07.9,    Vent. Rate : 077 BPM     Atrial Rate : 077 BPM     P-R Int : 126 ms          QRS Dur : 088 ms      QT Int : 380 ms       P-R-T Axes : 070 081 081 degrees     QTc Int : 430 ms    Normal sinus rhythm  Normal ECG  When compared with ECG of 27-DEC-2023 10:34,  No significant change was found  Confirmed by Johann Dean MD  ([id number]) on 4/15/2024 7:34:16 AM    Referred By:             Confirmed By:Johann Dean MD                                  Imaging Results              CTA Chest Non-Coronary (PE Studies) (Final result)  Result time 04/15/24 06:16:38      Final result by Asher Devlin MD (04/15/24 06:16:38)                   Impression:      No pulmonary embolism identified.    Multifocal pneumonia.      Electronically signed by: Asher Devlin  Date:    04/15/2024  Time:    06:16               Narrative:    EXAMINATION:  CTA CHEST NON CORONARY (PE STUDIES)    CLINICAL HISTORY:  Pulmonary embolism (PE) suspected, unknown D-dimer;    TECHNIQUE:  CTA imaging of the chest after IV contrast. Axial, coronal and sagittal reconstructions, including MIP images, are reviewed. Dose length product 447 mGycm. Automatic exposure control, adjustment of mA/kV or iterative reconstruction technique used to limit radiation dose.    COMPARISON:  CT 12/27/2023    FINDINGS:  Diagnostic quality: Adequate    Pulmonary embolism: None identified.    Lung parenchyma: Scattered multifocal areas of ill-defined consolidation and ground-glass in both lungs.  Mild emphysema.  Bronchial wall thickening and some endobronchial debris in the left lower lobe.    Pleural effusion: None.    Mediastinum/carolyn: Normal heart size and thoracic aortic caliber.  Mild coronary artery calcifications.  No pathologically enlarged lymph node.    Chest wall/axilla: Left axillary and subpectoral lymph nodes are smaller since December with no pathologically enlarged lymph node identified today.    Upper abdomen: Small gallstone.    Bones: No acute osseous process.                                       X-Ray Chest 1 View (Final result)  Result time 04/15/24 07:12:15      Final result by Varun Gomez MD (04/15/24 07:12:15)                   Impression:      Findings concerning for development of infectious process.  Recommend continued follow-up.      Electronically signed  by: Varun Gomez  Date:    04/15/2024  Time:    07:12               Narrative:    EXAMINATION:  XR CHEST 1 VIEW    CLINICAL HISTORY:  chest pain;    TECHNIQUE:  Single view of the chest    COMPARISON:  12/27/2023    FINDINGS:  Development of a right greater than left mid to lower lung zone opacifications.    The cardiomediastinal silhouette is within normal limits.    No acute osseous abnormality.                                       Medications   albuterol nebulizer solution 10 mg (10 mg Nebulization Given 4/15/24 0606)   dexAMETHasone injection 8 mg (8 mg Intravenous Given 4/15/24 0553)   iohexoL (OMNIPAQUE 350) injection 100 mL (100 mLs Intravenous Given 4/15/24 0606)   cefTRIAXone (Rocephin) 1 g in dextrose 5 % in water (D5W) 100 mL IVPB (MB+) (0 g Intravenous Stopped 4/15/24 0656)   azithromycin 500 mg in dextrose 5 % 250 mL IVPB (ready to mix) (0 mg Intravenous Stopped 4/15/24 0825)     Medical Decision Making  Problems Addressed:  Chest pain: acute illness or injury  Current every day smoker: chronic illness or injury with exacerbation, progression, or side effects of treatment  Hypoxia: acute illness or injury  Multifocal pneumonia: acute illness or injury  Other emphysema: undiagnosed new problem with uncertain prognosis    Amount and/or Complexity of Data Reviewed  Labs: ordered.  Radiology: ordered.    Risk  Prescription drug management.  Decision regarding hospitalization.      ED assessment:    Mr. Mathew presented to the emergency department for evaluation of generalized weakness, subjective fever, chills productive cough for the last proximally 2 weeks.  Denies any known pulmonary diagnosis; however, reports smokes a lot.  Reports chest pain as well as exertional dyspnea, states can only walk a few steps without getting severely short of breath for the last 10 days or so.    Differential diagnosis (including but not limited to):   Pneumonia, COPD with acute exacerbation, reactive airway disease,  acute viral syndrome, influenza, COVID-19, congestive heart failure, atypical myocardial infarction    ED management:   Hypoxic on arrival - placed on NC  and given steroids, nebs w/ improvement. CXR w/ multifocal hazy opacities. D dimer elevated. CT w/ no PE, redemonstration of multifocal pneumonia. Started on IV abx for community acquired pneumonia and will admit to hospitalist service for further inpt evaluation and management.     My independent radiology interpretation:   CXR: multifocal infiltrates    Amount and/or Complexity of Data Reviewed  Independent historian: none   Summary of history:   External data reviewed: notes from previous admissions and prior imaging  Summary of data reviewed:  Admission 12/20/2023 for multifocal pneumonia, hypoxic on that presentation as well.  At that time reported most in their trailer as well.  Risk and benefits of testing: discussed   Labs: ordered and reviewed  Radiology: ordered and independent interpretation performed (see above or ED course)  ECG/medicine tests: ordered and independent interpretation performed (see above or ED course)  Discussion of management or test interpretation with external provider(s): discussed with hospitalist physician   Summary of discussion: discussed with hospitalist who accepts for admission    Risk  Prescription drug management   Decision regarding hospitalization  Shared decision making     Critical Care  none    I, Tamera Wild MD personally performed the history, PE, MDM, and procedures as documented above and agree with the scribe's documentation.              ED Course as of 04/18/24 0823   Mon Apr 15, 2024   0502 RN reports SpO2 was 88-89% on room air.  Placed on 2 L nasal cannula. [KS]      ED Course User Index  [KS] Tamera Wild MD                           Clinical Impression:  Final diagnoses:  [R07.9] Chest pain  [J43.8] Other emphysema  [J18.9] Multifocal pneumonia  [R09.02] Hypoxia  [F17.200] Current every day  smoker          ED Disposition Condition    Admit Stable                Tamera Wild MD  04/18/24 0865

## 2024-04-15 NOTE — H&P
"[facility] Hospital Medicine History & Physical Examination       Patient Name: Boy Mathew  MRN: [id number]  Patient Class: IP- Inpatient   Admission Date: 04/15/2024   Admitting Service: Hospital Medicine   Length of Stay: 0  Attending Physician: Kevan Dixon MD  Primary Care Provider: Geri Primary Doctor  Face-to-Face encounter date: 04/15/2024  Code Status: DNR  Chief Complaint: Cough (Wants to be checked for "black mold". States coughing for 12 days, feels like his lungs are "full of fluid". C/o cold sweats, chest pain, sob. Wife here for similar complaints "but I'm older so I'm sicker". "Took medicine for TB over 20years ago but I dont know if I really had it. I have Hepatitis C")      Present at Bedside: None  Patient information was obtained from patient, patient's family, past medical records and ER records.      HISTORY OF PRESENT ILLNESS:   Boy Mathew is a 58 y.o. male with a past medical history of tobacco abuse, tuberculosis, and hepatitis-C who presented to [facility] on 4/15/2024 with c/o cough.  Patient reported productive cough, shortness of breath, chest pain, fever, and chills began about 12 days ago.  Patient reported yellow sputum production with T-max of 101°.  Patient denied nausea, vomiting, diarrhea, and abdominal pain.  Patient also endorsed smoking 2 packs of cigarettes daily.  Initial vital signs in ED were /69, pulse 87, respirations 19, temperature 36.9° C, and SpO2 90% on room air.  Labs revealed WBC 10.85, glucose 101, calcium 1.11, BNP less than 10, undetectable troponin, D-dimer 1.19, and lactic acid 1.  COVID/flu/RSV testing was negative.  EKG revealed normal sinus rhythm with heart rate of 77 beats per minute.  Chest x-ray revealed findings concerning for development of infectious process.  CTA chest revealed no pulmonary embolism identified with multifocal pneumonia.  Patient was placed on 2 L supplemental oxygen and given Albuterol nebulizer 10 " mg, IV Dexamethasone 8 mg, IV Rocephin 1 g, and IV Azithromycin 500 mg in ED. Patient was admitted to hospital medicine service for further medical management.     PAST MEDICAL HISTORY:   Tobacco abuse   Tuberculosis   Hepatitis-C    PAST SURGICAL HISTORY:   Hernia repair    FAMILY HISTORY:   Brother: Cirrhosis    SOCIAL HISTORY:   Smokes 2 pack of cigarettes daily  Former IV drug use:  Cocaine use 20 years ago   Drinks 2-3 beers on the weekends    ALLERGIES:   Patient has no known allergies.    HOME MEDICATIONS:     Prior to Admission medications    Not on File     ________________________________________________________________________  INPATIENT LIST OF MEDICATIONS     Current Facility-Administered Medications:     acetaminophen tablet 650 mg, 650 mg, Oral, Q8H PRN, Kelvin Sanchez PA-C    acetaminophen tablet 650 mg, 650 mg, Oral, Q4H PRN, Kelvin Sanchez PA-C    azithromycin 500 mg in dextrose 5 % 250 mL IVPB (ready to mix), 500 mg, Intravenous, ED 1 Time, Tamera Wild MD, Last Rate: 250 mL/hr at 04/15/24 0725, 500 mg at 04/15/24 0725    [START ON 4/16/2024] azithromycin 500 mg in dextrose 5 % 250 mL IVPB (ready to mix), 500 mg, Intravenous, Q24H, Kelvin Sanchez PA-C    benzonatate capsule 200 mg, 200 mg, Oral, TID PRN, Kelvin Sanchez PA-C    [START ON 4/16/2024] cefTRIAXone (Rocephin) 1 g in dextrose 5 % in water (D5W) 100 mL IVPB (MB+), 1 g, Intravenous, Q24H, Kelvin Sanchez PA-C    dextrose 10% bolus 125 mL 125 mL, 12.5 g, Intravenous, PRN, Kelvin Sanchez PA-C    dextrose 10% bolus 250 mL 250 mL, 25 g, Intravenous, PRN, Kelvin Sanchez PA-C    glucagon (human recombinant) injection 1 mg, 1 mg, Intramuscular, PRN, Kelvin Sanchez PA-C    glucose chewable tablet 16 g, 16 g, Oral, PRN, Kelvin Sanchez PA-C    glucose chewable tablet 24 g, 24 g, Oral, PRN, Kelvin Sanchez PA-C    ondansetron injection 4 mg, 4 mg, Intravenous, Q8H PRN, Kelvin Sanchez PA-C  No current outpatient medications on  file.    Scheduled Meds:  Current Facility-Administered Medications   Medication Dose Route Frequency Provider Last Rate Last Admin    acetaminophen tablet 650 mg  650 mg Oral Q8H PRN Kelvin Sanchez PA-C        acetaminophen tablet 650 mg  650 mg Oral Q4H PRN Kelvin Sanchez PA-C        azithromycin 500 mg in dextrose 5 % 250 mL IVPB (ready to mix)  500 mg Intravenous ED 1 Time Tamera Wild  mL/hr at 04/15/24 0725 500 mg at 04/15/24 0725    [START ON 4/16/2024] azithromycin 500 mg in dextrose 5 % 250 mL IVPB (ready to mix)  500 mg Intravenous Q24H Kelvin Sanchez PA-C        benzonatate capsule 200 mg  200 mg Oral TID PRN Kelvin Sanchez PA-C        [START ON 4/16/2024] cefTRIAXone (Rocephin) 1 g in dextrose 5 % in water (D5W) 100 mL IVPB (MB+)  1 g Intravenous Q24H Kelvin Sanchez PA-C        dextrose 10% bolus 125 mL 125 mL  12.5 g Intravenous PRN Kelvin Sanchez PA-C        dextrose 10% bolus 250 mL 250 mL  25 g Intravenous PRN Kelvin Sanchez PA-C        glucagon (human recombinant) injection 1 mg  1 mg Intramuscular PRN Kelvin Sanchez PA-C        glucose chewable tablet 16 g  16 g Oral PRN Kelvin Sanchez PA-C        glucose chewable tablet 24 g  24 g Oral PRN Kelvin Sanchez PA-C        ondansetron injection 4 mg  4 mg Intravenous Q8H PRN Kelvin Sanchez PA-C         No current outpatient medications on file.     Continuous Infusions:  Current Facility-Administered Medications   Medication Dose Route Frequency Provider Last Rate Last Admin    acetaminophen tablet 650 mg  650 mg Oral Q8H PRN Kelvin Sanchez PA-C        acetaminophen tablet 650 mg  650 mg Oral Q4H PRN Kelvin Sanchez PA-C        azithromycin 500 mg in dextrose 5 % 250 mL IVPB (ready to mix)  500 mg Intravenous ED 1 Time Tamera Wild  mL/hr at 04/15/24 0725 500 mg at 04/15/24 0725    [START ON 4/16/2024] azithromycin 500 mg in dextrose 5 % 250 mL IVPB (ready to mix)  500 mg Intravenous Q24H Kelvin Sanchez,  PA-C        benzonatate capsule 200 mg  200 mg Oral TID PRN Kelvin Sanchez PA-C        [START ON 4/16/2024] cefTRIAXone (Rocephin) 1 g in dextrose 5 % in water (D5W) 100 mL IVPB (MB+)  1 g Intravenous Q24H Kelvin Sanchez PA-TANYA        dextrose 10% bolus 125 mL 125 mL  12.5 g Intravenous PRN Kelvin Sanchez PA-TANYA        dextrose 10% bolus 250 mL 250 mL  25 g Intravenous PRN Kelvin Sanchez PA-TANYA        glucagon (human recombinant) injection 1 mg  1 mg Intramuscular PRN Kelvin Sanchez PA-C        glucose chewable tablet 16 g  16 g Oral PRN Kelvin Sanchez PA-C        glucose chewable tablet 24 g  24 g Oral PRN Kelvin Sanchez PA-C        ondansetron injection 4 mg  4 mg Intravenous Q8H PRN Kelvin Sanchez PA-C         No current outpatient medications on file.     PRN Meds:.  Current Facility-Administered Medications   Medication Dose Route Frequency Provider Last Rate Last Admin    acetaminophen tablet 650 mg  650 mg Oral Q8H PRN Kelvin Sanchez PA-TANYA        acetaminophen tablet 650 mg  650 mg Oral Q4H PRN Kelvin Sanchez PA-C        azithromycin 500 mg in dextrose 5 % 250 mL IVPB (ready to mix)  500 mg Intravenous ED 1 Time Tamera Wild  mL/hr at 04/15/24 0725 500 mg at 04/15/24 0725    [START ON 4/16/2024] azithromycin 500 mg in dextrose 5 % 250 mL IVPB (ready to mix)  500 mg Intravenous Q24H Kelvin Sanchez PA-C        benzonatate capsule 200 mg  200 mg Oral TID PRN Kelvin Sanchez PA-C        [START ON 4/16/2024] cefTRIAXone (Rocephin) 1 g in dextrose 5 % in water (D5W) 100 mL IVPB (MB+)  1 g Intravenous Q24H Kelvin Sanchez PA-C        dextrose 10% bolus 125 mL 125 mL  12.5 g Intravenous PRN Kelvin Sanchez PA-C        dextrose 10% bolus 250 mL 250 mL  25 g Intravenous PRN Kelvin Sanchez PA-C        glucagon (human recombinant) injection 1 mg  1 mg Intramuscular PRN Kelvin Sanchez PA-C        glucose chewable tablet 16 g  16 g Oral MARYELLENN Kelvin Sanchez PA-C        glucose chewable  tablet 24 g  24 g Oral PRN Kelvin Sanchez PA-C        ondansetron injection 4 mg  4 mg Intravenous Q8H PRN Kelvin Sanchez PA-C         No current outpatient medications on file.         REVIEW OF SYSTEMS:   Except as documented, all other systems reviewed and negative.    PHYSICAL EXAM:     VITAL SIGNS: 24 HRS MIN & MAX LAST   Temp  Min: 98.5 °F (36.9 °C)  Max: 98.5 °F (36.9 °C) 98.5 °F (36.9 °C)   BP  Min: 100/69  Max: 141/89 (!) 141/89   Pulse  Min: 73  Max: 87  73   Resp  Min: 19  Max: 20 20   SpO2  Min: 89 %  Max: 95 % 95 %       General appearance:  Thin male in no apparent distress.  HENT: Atraumatic head.   Eyes: Normal extraocular movements.   Neck: Supple.   Lungs: Coarse breath sounds on the right. Decreased breath sounds on the left   Heart: Regular rate and rhythm.  Abdomen: Soft, non-distended, non-tender.  Extremities: No cyanosis, clubbing, or deformities.   Skin: No Rash.   Neuro: Awake, alert, and oriented. Motor and sensory exams grossly intact.   Psych/mental status: Appropriate mood and affect. Responds appropriately to questions.     LABS AND IMAGING:     Recent Labs   Lab 04/15/24  0504   WBC 10.85   RBC 5.00   HGB 15.3   HCT 46.0   MCV 92.0   MCH 30.6   MCHC 33.3   RDW 13.2      MPV 9.4       Recent Labs   Lab 04/15/24  0504 04/15/24  0516     --    K 3.7  --    CO2 26  --    BUN 11.3  --    CREATININE 0.77  --    CALCIUM 9.2  --    PH  --  7.420   ALBUMIN 2.9*  --    ALKPHOS 59  --    ALT 15  --    AST 22  --    BILITOT 0.5  --        Microbiology Results (last 7 days)       Procedure Component Value Units Date/Time    Blood Culture #1 **CANNOT BE ORDERED STAT** [[id number]] Collected: 04/15/24 0635    Order Status: Sent Specimen: Blood from Antecubital, Right Updated: 04/15/24 0649    Blood Culture #2 **CANNOT BE ORDERED STAT** [[id number]] Collected: 04/15/24 0635    Order Status: Sent Specimen: Blood from Arm, Right Updated: 04/15/24 0649             X-Ray Chest 1  View  Narrative: EXAMINATION:  XR CHEST 1 VIEW    CLINICAL HISTORY:  chest pain;    TECHNIQUE:  Single view of the chest    COMPARISON:  12/27/2023    FINDINGS:  Development of a right greater than left mid to lower lung zone opacifications.    The cardiomediastinal silhouette is within normal limits.    No acute osseous abnormality.  Impression: Findings concerning for development of infectious process.  Recommend continued follow-up.    Electronically signed by: Varun Gomez  Date:    04/15/2024  Time:    07:12  CTA Chest Non-Coronary (PE Studies)  Narrative: EXAMINATION:  CTA CHEST NON CORONARY (PE STUDIES)    CLINICAL HISTORY:  Pulmonary embolism (PE) suspected, unknown D-dimer;    TECHNIQUE:  CTA imaging of the chest after IV contrast. Axial, coronal and sagittal reconstructions, including MIP images, are reviewed. Dose length product 447 mGycm. Automatic exposure control, adjustment of mA/kV or iterative reconstruction technique used to limit radiation dose.    COMPARISON:  CT 12/27/2023    FINDINGS:  Diagnostic quality: Adequate    Pulmonary embolism: None identified.    Lung parenchyma: Scattered multifocal areas of ill-defined consolidation and ground-glass in both lungs.  Mild emphysema.  Bronchial wall thickening and some endobronchial debris in the left lower lobe.    Pleural effusion: None.    Mediastinum/carolyn: Normal heart size and thoracic aortic caliber.  Mild coronary artery calcifications.  No pathologically enlarged lymph node.    Chest wall/axilla: Left axillary and subpectoral lymph nodes are smaller since December with no pathologically enlarged lymph node identified today.    Upper abdomen: Small gallstone.    Bones: No acute osseous process.  Impression: No pulmonary embolism identified.    Multifocal pneumonia.    Electronically signed by: Asher Devlin  Date:    04/15/2024  Time:    06:16        ASSESSMENT & PLAN:   Assessment:   Acute hypoxic respiratory failure   Multifocal  Pneumonia  Tobacco abuse  Suspected COPD    Plan:  Continue supplemental oxygen, wean as tolerated   IV Rocephin and Azithromycin  Blood cultures obtained, follow up results  Respiratory panel +Rhinovirus/Enterovirus   MRSA PCR   Respiratory culture   DuoNebs q.6  Prednisone 60 mg daily x4 days starting tomorrow 4/16/2024  PRN anti-tussives   Nicotine patch daily  Resume home medications as deemed appropriate once medication reconciliation is updated  Labs in AM    VTE Prophylaxis:  Lovenox    Discharge Planning and Disposition: TBD    INES, Kelvin Sanchez PA-C have reviewed and discussed the case with Kevan Dixon MD  Please see the attending MD's addendum for further assessment and plan.    Kelvin Sanchez PA-C  Department of Hospital Medicine   [facility]   04/15/2024    This note was created with the assistance of [employer] voice recognition software. There may be transcription errors as a result of using this technology, however minimal. Effort has been made to assure accuracy of transcription, but any obvious errors or omissions should be clarified with the author of the document.    _______________________________________________________________________________  MD Addendum:  Dr. INES , assumed care of this patient today at --am/pm  For the patient encounter, I performed the substantive portion of the visit, I reviewed the NP/PA documentation, treatment plan, and medical decision making.  I had face to face time with this patient     A. History:    B. Physical exam:    C. Medical decision making:      All diagnosis and differential diagnosis have been reviewed; assessment and plan has been documented; I have personally reviewed the labs and test results that are presently available; I have reviewed the patients medication list; I have reviewed the consulting providers response and recommendations. I have reviewed or attempted to review medical records based upon their  availability.    All of the patient and family questions have been addressed and answered. Patient's is agreeable to the above stated plan. I will continue to monitor closely and make adjustments to medical management as needed.      04/15/2024

## 2024-04-16 LAB
ALBUMIN SERPL-MCNC: 2.6 G/DL (ref 3.5–5)
ALBUMIN/GLOB SERPL: 0.7 RATIO (ref 1.1–2)
ALP SERPL-CCNC: 72 UNIT/L (ref 40–150)
ALT SERPL-CCNC: 12 UNIT/L (ref 0–55)
AST SERPL-CCNC: 16 UNIT/L (ref 5–34)
BASOPHILS # BLD AUTO: 0.05 X10(3)/MCL
BASOPHILS NFR BLD AUTO: 0.3 %
BILIRUB SERPL-MCNC: 0.2 MG/DL
BUN SERPL-MCNC: 15.1 MG/DL (ref 8.4–25.7)
CALCIUM SERPL-MCNC: 8.5 MG/DL (ref 8.4–10.2)
CHLORIDE SERPL-SCNC: 107 MMOL/L (ref 98–107)
CO2 SERPL-SCNC: 25 MMOL/L (ref 22–29)
CREAT SERPL-MCNC: 0.85 MG/DL (ref 0.73–1.18)
EOSINOPHIL # BLD AUTO: 0.16 X10(3)/MCL (ref 0–0.9)
EOSINOPHIL NFR BLD AUTO: 1 %
ERYTHROCYTE [DISTWIDTH] IN BLOOD BY AUTOMATED COUNT: 12.9 % (ref 11.5–17)
GFR SERPLBLD CREATININE-BSD FMLA CKD-EPI: >60 MLS/MIN/1.73/M2
GLOBULIN SER-MCNC: 3.5 GM/DL (ref 2.4–3.5)
GLUCOSE SERPL-MCNC: 165 MG/DL (ref 74–100)
HCT VFR BLD AUTO: 41.2 % (ref 42–52)
HGB BLD-MCNC: 13.6 G/DL (ref 14–18)
IMM GRANULOCYTES # BLD AUTO: 0.07 X10(3)/MCL (ref 0–0.04)
IMM GRANULOCYTES NFR BLD AUTO: 0.5 %
LYMPHOCYTES # BLD AUTO: 2.52 X10(3)/MCL (ref 0.6–4.6)
LYMPHOCYTES NFR BLD AUTO: 16.3 %
MCH RBC QN AUTO: 30.3 PG (ref 27–31)
MCHC RBC AUTO-ENTMCNC: 33 G/DL (ref 33–36)
MCV RBC AUTO: 91.8 FL (ref 80–94)
MONOCYTES # BLD AUTO: 1.18 X10(3)/MCL (ref 0.1–1.3)
MONOCYTES NFR BLD AUTO: 7.6 %
NEUTROPHILS # BLD AUTO: 11.5 X10(3)/MCL (ref 2.1–9.2)
NEUTROPHILS NFR BLD AUTO: 74.3 %
NRBC BLD AUTO-RTO: 0 %
PLATELET # BLD AUTO: 381 X10(3)/MCL (ref 130–400)
PMV BLD AUTO: 9.5 FL (ref 7.4–10.4)
POTASSIUM SERPL-SCNC: 4.1 MMOL/L (ref 3.5–5.1)
PROT SERPL-MCNC: 6.1 GM/DL (ref 6.4–8.3)
RBC # BLD AUTO: 4.49 X10(6)/MCL (ref 4.7–6.1)
SODIUM SERPL-SCNC: 142 MMOL/L (ref 136–145)
WBC # SPEC AUTO: 15.48 X10(3)/MCL (ref 4.5–11.5)

## 2024-04-16 PROCEDURE — 99900035 HC TECH TIME PER 15 MIN (STAT)

## 2024-04-16 PROCEDURE — 21400001 HC TELEMETRY ROOM

## 2024-04-16 PROCEDURE — 27000221 HC OXYGEN, UP TO 24 HOURS

## 2024-04-16 PROCEDURE — 85025 COMPLETE CBC W/AUTO DIFF WBC: CPT | Performed by: PHYSICIAN ASSISTANT

## 2024-04-16 PROCEDURE — 25000003 PHARM REV CODE 250: Performed by: INTERNAL MEDICINE

## 2024-04-16 PROCEDURE — 80053 COMPREHEN METABOLIC PANEL: CPT | Performed by: PHYSICIAN ASSISTANT

## 2024-04-16 PROCEDURE — 94760 N-INVAS EAR/PLS OXIMETRY 1: CPT

## 2024-04-16 PROCEDURE — 99900031 HC PATIENT EDUCATION (STAT)

## 2024-04-16 PROCEDURE — 63600175 PHARM REV CODE 636 W HCPCS: Performed by: PHYSICIAN ASSISTANT

## 2024-04-16 PROCEDURE — 25000003 PHARM REV CODE 250: Performed by: PHYSICIAN ASSISTANT

## 2024-04-16 PROCEDURE — 94640 AIRWAY INHALATION TREATMENT: CPT

## 2024-04-16 PROCEDURE — S4991 NICOTINE PATCH NONLEGEND: HCPCS | Performed by: PHYSICIAN ASSISTANT

## 2024-04-16 PROCEDURE — 25000242 PHARM REV CODE 250 ALT 637 W/ HCPCS: Performed by: PHYSICIAN ASSISTANT

## 2024-04-16 PROCEDURE — 27000207 HC ISOLATION

## 2024-04-16 PROCEDURE — 63600175 PHARM REV CODE 636 W HCPCS: Performed by: INTERNAL MEDICINE

## 2024-04-16 RX ADMIN — IPRATROPIUM BROMIDE AND ALBUTEROL SULFATE 3 ML: .5; 3 SOLUTION RESPIRATORY (INHALATION) at 08:04

## 2024-04-16 RX ADMIN — BENZONATATE 200 MG: 100 CAPSULE ORAL at 04:04

## 2024-04-16 RX ADMIN — GUAIFENESIN AND DEXTROMETHORPHAN 10 ML: 100; 10 SYRUP ORAL at 11:04

## 2024-04-16 RX ADMIN — AZITHROMYCIN MONOHYDRATE 500 MG: 500 INJECTION, POWDER, LYOPHILIZED, FOR SOLUTION INTRAVENOUS at 06:04

## 2024-04-16 RX ADMIN — BENZONATATE 200 MG: 100 CAPSULE ORAL at 11:04

## 2024-04-16 RX ADMIN — BENZONATATE 200 MG: 100 CAPSULE ORAL at 09:04

## 2024-04-16 RX ADMIN — GUAIFENESIN AND DEXTROMETHORPHAN 10 ML: 100; 10 SYRUP ORAL at 03:04

## 2024-04-16 RX ADMIN — ENOXAPARIN SODIUM 40 MG: 40 INJECTION SUBCUTANEOUS at 04:04

## 2024-04-16 RX ADMIN — IPRATROPIUM BROMIDE AND ALBUTEROL SULFATE 3 ML: .5; 3 SOLUTION RESPIRATORY (INHALATION) at 07:04

## 2024-04-16 RX ADMIN — GUAIFENESIN AND DEXTROMETHORPHAN 10 ML: 100; 10 SYRUP ORAL at 04:04

## 2024-04-16 RX ADMIN — CEFTRIAXONE SODIUM 1 G: 1 INJECTION, POWDER, FOR SOLUTION INTRAMUSCULAR; INTRAVENOUS at 05:04

## 2024-04-16 RX ADMIN — GUAIFENESIN AND DEXTROMETHORPHAN 10 ML: 100; 10 SYRUP ORAL at 09:04

## 2024-04-16 RX ADMIN — NICOTINE 1 PATCH: 21 PATCH, EXTENDED RELEASE TRANSDERMAL at 09:04

## 2024-04-16 RX ADMIN — PREDNISONE 60 MG: 10 TABLET ORAL at 09:04

## 2024-04-16 NOTE — NURSING
Nurses Note -- 4 Eyes      4/15/2024   8:49 PM      Skin assessed during: Admit      [x] No Altered Skin Integrity Present    [x]Prevention Measures Documented      [] Yes- Altered Skin Integrity Present or Discovered   [] LDA Added if Not in Epic (Describe Wound)   [] New Altered Skin Integrity was Present on Admit and Documented in LDA   [] Wound Image Taken    Wound Care Consulted? No    Attending Nurse:  Fran Mosley RN    Second RN/Staff Member:   Nikkie Lama RN

## 2024-04-16 NOTE — PROGRESS NOTES
[facility]  Hospital Medicine Progress Note      Chief Complaint: cough    HPI: (personally reviewed by me and is documented from initial H&P)     58 y.o. male with a past medical history of tobacco abuse, tuberculosis, and hepatitis-C who presented to [facility] on 4/15/2024 with c/o cough.      Patient reported productive cough, shortness of breath, chest pain, fever, and chills began about 12 days ago.  Patient reported yellow sputum production with T-max of 101°.    Patient denied nausea, vomiting, diarrhea, and abdominal pain.  Patient also endorsed smoking 2 packs of cigarettes daily.    Initial vital signs in ED were /69, pulse 87, respirations 19, temperature 36.9° C, and SpO2 90% on room air.  Labs revealed WBC 10.85, glucose 101, calcium 1.11, BNP less than 10, undetectable troponin, D-dimer 1.19, and lactic acid 1.  COVID/flu/RSV testing was negative.  EKG revealed normal sinus rhythm with heart rate of 77 beats per minute.  Chest x-ray revealed findings concerning for development of infectious process.  CTA chest revealed no pulmonary embolism identified with multifocal pneumonia.  Patient was placed on 2 L supplemental oxygen and given Albuterol nebulizer 10 mg, IV Dexamethasone 8 mg, IV Rocephin 1 g, and IV Azithromycin 500 mg in ED. Patient was admitted to hospital medicine service for further medical management.   __________________________________________________________________________________________________________________________    Objective/physical exam:  Vital signs have been personally reviewed by me   General: Appears comfortable, no acute distress.    Integumentary: Warm, dry, intact.  Musculoskeletal: Purposeful movement noted.     Respiratory: No accessory muscle use. Breath sounds are equal.  Cardiovascular: Regular rate.       VITAL SIGNS: 24 HRS MIN & MAX LAST   Temp  Min: 98 °F (36.7 °C)  Max: 99 °F (37.2 °C) 98.3 °F (36.8 °C)   BP  Min: 106/79  Max:  139/91 120/67   Pulse  Min: 73  Max: 99  74   Resp  Min: 17  Max: 20 18   SpO2  Min: 89 %  Max: 96 % (!) 92 %     X-Ray Chest 1 View  Narrative: EXAMINATION:  XR CHEST 1 VIEW    CLINICAL HISTORY:  chest pain;    TECHNIQUE:  Single view of the chest    COMPARISON:  12/27/2023    FINDINGS:  Development of a right greater than left mid to lower lung zone opacifications.    The cardiomediastinal silhouette is within normal limits.    No acute osseous abnormality.  Impression: Findings concerning for development of infectious process.  Recommend continued follow-up.    Electronically signed by: Varun Gomez  Date:    04/15/2024  Time:    07:12  CTA Chest Non-Coronary (PE Studies)  Narrative: EXAMINATION:  CTA CHEST NON CORONARY (PE STUDIES)    CLINICAL HISTORY:  Pulmonary embolism (PE) suspected, unknown D-dimer;    TECHNIQUE:  CTA imaging of the chest after IV contrast. Axial, coronal and sagittal reconstructions, including MIP images, are reviewed. Dose length product 447 mGycm. Automatic exposure control, adjustment of mA/kV or iterative reconstruction technique used to limit radiation dose.    COMPARISON:  CT 12/27/2023    FINDINGS:  Diagnostic quality: Adequate    Pulmonary embolism: None identified.    Lung parenchyma: Scattered multifocal areas of ill-defined consolidation and ground-glass in both lungs.  Mild emphysema.  Bronchial wall thickening and some endobronchial debris in the left lower lobe.    Pleural effusion: None.    Mediastinum/carolyn: Normal heart size and thoracic aortic caliber.  Mild coronary artery calcifications.  No pathologically enlarged lymph node.    Chest wall/axilla: Left axillary and subpectoral lymph nodes are smaller since December with no pathologically enlarged lymph node identified today.    Upper abdomen: Small gallstone.    Bones: No acute osseous process.  Impression: No pulmonary embolism identified.    Multifocal pneumonia.    Electronically signed by: Asher  Claus  Date:    04/15/2024  Time:    06:16    Recent Labs   Lab 04/15/24  0504 04/16/24  0408   WBC 10.85 15.48*   RBC 5.00 4.49*   HGB 15.3 13.6*   HCT 46.0 41.2*   MCV 92.0 91.8   MCH 30.6 30.3   MCHC 33.3 33.0   RDW 13.2 12.9    381   MPV 9.4 9.5       Recent Labs   Lab 04/15/24  0504 04/15/24  0516 04/16/24 0408     --  142   K 3.7  --  4.1   CO2 26  --  25   BUN 11.3  --  15.1   CREATININE 0.77  --  0.85   CALCIUM 9.2  --  8.5   PH  --  7.420  --    ALBUMIN 2.9*  --  2.6*   ALKPHOS 59  --  72   ALT 15  --  12   AST 22  --  16   BILITOT 0.5  --  0.2     Microbiology Results (last 7 days)       Procedure Component Value Units Date/Time    Respiratory Culture [[id number]] Collected: 04/15/24 0935    Order Status: Completed Specimen: Sputum, Expectorated Updated: 04/16/24 0917     Respiratory Culture Normal respiratory gisella     GRAM STAIN Quality 3+      Many Gram positive cocci      Few Gram Negative Rods    Blood Culture #1 **CANNOT BE ORDERED STAT** [[id number]]  (Normal) Collected: 04/15/24 0635    Order Status: Completed Specimen: Blood from Antecubital, Right Updated: 04/16/24 0900     CULTURE, BLOOD ([facility]) No Growth At 24 Hours    Blood Culture #2 **CANNOT BE ORDERED STAT** [[id number]]  (Normal) Collected: 04/15/24 0635    Order Status: Completed Specimen: Blood from Arm, Right Updated: 04/16/24 0900     CULTURE, BLOOD ([facility]) No Growth At 24 Hours             Scheduled Med:  Current Facility-Administered Medications   Medication Dose Route Frequency Provider Last Rate Last Admin    acetaminophen tablet 650 mg  650 mg Oral Q8H PRN Kelvin Sanchez PA-C        acetaminophen tablet 650 mg  650 mg Oral Q4H PRN Kelvin Sanchez PA-C        albuterol-ipratropium 2.5 mg-0.5 mg/3 mL nebulizer solution 3 mL  3 mL Nebulization Q6H [place] Kelvin Sanchez PA-C   3 mL at 04/16/24 0810    azithromycin 500 mg in dextrose 5 % 250 mL IVPB (ready to mix)  500 mg Intravenous Q24H Kevan Dixon MD   Stopped at  04/16/24 0734    benzonatate capsule 200 mg  200 mg Oral TID Kevan Dixon MD   200 mg at 04/16/24 0916    cefTRIAXone (Rocephin) 1 g in dextrose 5 % in water (D5W) 100 mL IVPB (MB+)  1 g Intravenous Q24H Kevan Dixon MD   Stopped at 04/16/24 0611    dextromethorphan-guaiFENesin  mg/5 ml liquid 10 mL  10 mL Oral Q4H Kevan Dixon MD   10 mL at 04/16/24 0916    dextrose 10% bolus 125 mL 125 mL  12.5 g Intravenous PRN Kelvin Sanchez PA-C        dextrose 10% bolus 250 mL 250 mL  25 g Intravenous PRN Kelvin Sanchez PA-C        enoxaparin injection 40 mg  40 mg Subcutaneous Q24H (prophylaxis, 1700) Kelvin Sanchez PA-C   40 mg at 04/15/24 1624    glucagon (human recombinant) injection 1 mg  1 mg Intramuscular PRN Kelvin Sanchez PA-C        glucose chewable tablet 16 g  16 g Oral PRN Kelvin Sanchez PA-C        glucose chewable tablet 24 g  24 g Oral PRN Kelvin Sanchez PA-C        nicotine 21 mg/24 hr 1 patch  1 patch Transdermal Daily Kelvin Sanchez PA-C   1 patch at 04/16/24 0917    ondansetron injection 4 mg  4 mg Intravenous Q8H PRN Kelvin Sanchez PA-C        predniSONE tablet 60 mg  60 mg Oral Daily Kelvin Sanchez PA-C   60 mg at 04/16/24 0916      Continuous Infusions:  Current Facility-Administered Medications   Medication Dose Route Frequency Provider Last Rate Last Admin    acetaminophen tablet 650 mg  650 mg Oral Q8H PRN Kelvin Sanchez PA-C        acetaminophen tablet 650 mg  650 mg Oral Q4H PRN Kelvin Sanchez PA-C        albuterol-ipratropium 2.5 mg-0.5 mg/3 mL nebulizer solution 3 mL  3 mL Nebulization Q6H [place] Kelvin Sanchez PA-C   3 mL at 04/16/24 0810    azithromycin 500 mg in dextrose 5 % 250 mL IVPB (ready to mix)  500 mg Intravenous Q24H Kevan Dixon MD   Stopped at 04/16/24 0734    benzonatate capsule 200 mg  200 mg Oral TID Kevan Dixon MD   200 mg at 04/16/24 0916    cefTRIAXone (Rocephin) 1 g in dextrose 5 % in water (D5W) 100 mL IVPB (MB+)  1 g Intravenous Q24H Kevan Dixon,  MD   Stopped at 04/16/24 0611    dextromethorphan-guaiFENesin  mg/5 ml liquid 10 mL  10 mL Oral Q4H Kevan Dixon MD   10 mL at 04/16/24 0916    dextrose 10% bolus 125 mL 125 mL  12.5 g Intravenous PRN Kelvin Sanchez PA-C        dextrose 10% bolus 250 mL 250 mL  25 g Intravenous PRN Kelvin Sanchez PA-C        enoxaparin injection 40 mg  40 mg Subcutaneous Q24H (prophylaxis, 1700) Kelvin Sanchez PA-C   40 mg at 04/15/24 1624    glucagon (human recombinant) injection 1 mg  1 mg Intramuscular PRN Kelvin Sanchez PA-C        glucose chewable tablet 16 g  16 g Oral PRN Kelvin Sanchez PA-C        glucose chewable tablet 24 g  24 g Oral PRN Kelvin Sanchez PA-C        nicotine 21 mg/24 hr 1 patch  1 patch Transdermal Daily Kelvin Sanchez PA-C   1 patch at 04/16/24 0917    ondansetron injection 4 mg  4 mg Intravenous Q8H PRN Kelvin Sanchez PA-C        predniSONE tablet 60 mg  60 mg Oral Daily Kelvin Sanchez PA-C   60 mg at 04/16/24 0916        PRN Meds:  Current Facility-Administered Medications   Medication Dose Route Frequency Provider Last Rate Last Admin    acetaminophen tablet 650 mg  650 mg Oral Q8H PRN Kelvin Sanchez PA-C        acetaminophen tablet 650 mg  650 mg Oral Q4H PRN Kelvin Sanchez PA-C        albuterol-ipratropium 2.5 mg-0.5 mg/3 mL nebulizer solution 3 mL  3 mL Nebulization Q6H [place] Kelvin Sanchez PA-C   3 mL at 04/16/24 0810    azithromycin 500 mg in dextrose 5 % 250 mL IVPB (ready to mix)  500 mg Intravenous Q24H Kevan Dixon MD   Stopped at 04/16/24 0734    benzonatate capsule 200 mg  200 mg Oral TID Kevan Dixon MD   200 mg at 04/16/24 0916    cefTRIAXone (Rocephin) 1 g in dextrose 5 % in water (D5W) 100 mL IVPB (MB+)  1 g Intravenous Q24H Kevan Dixon MD   Stopped at 04/16/24 0611    dextromethorphan-guaiFENesin  mg/5 ml liquid 10 mL  10 mL Oral Q4H Kevan Dixon MD   10 mL at 04/16/24 0916    dextrose 10% bolus 125 mL 125 mL  12.5 g Intravenous PRN Kelvin Sanchez,  PA-TANYA        dextrose 10% bolus 250 mL 250 mL  25 g Intravenous PRN Kelvin Sanchez PA-C        enoxaparin injection 40 mg  40 mg Subcutaneous Q24H (prophylaxis, 1700) Kelvin Sanchez PA-C   40 mg at 04/15/24 1624    glucagon (human recombinant) injection 1 mg  1 mg Intramuscular PRN Kelvin Sanchez PA-C        glucose chewable tablet 16 g  16 g Oral PRN Kelvin Sanchez PA-C        glucose chewable tablet 24 g  24 g Oral PRN Kelvin Sanchez PA-C        nicotine 21 mg/24 hr 1 patch  1 patch Transdermal Daily Kelvin Sanchez PA-C   1 patch at 04/16/24 0917    ondansetron injection 4 mg  4 mg Intravenous Q8H PRN Kelvin Sanchez PA-C        predniSONE tablet 60 mg  60 mg Oral Daily Kelvin Sanchez PA-C   60 mg at 04/16/24 0916      __________________________________________________________________________________________________________________________________    Assessment/Plan:  Community-acquired pneumonia    Above present on admission     Anticipated discharge and Disposition when medically stable:  Pending  _______________________________________________________________________________________________________________________________    Patient admitted for cough and fever noted to have pneumonia per CTA.    Continue broad-spectrum antibiotic therapy follow-up on respiratory culture/blood cultures which are negative at 24 hours.    MRSA PCR screen positive    Continue supportive care  Continue checking vital signs q4hrs.    Nurse notified to page me if any changes occur     DVT prophylaxis initiated   Nutrition Status:  Regular diet    _______________________________________________________________________________________________________________________________    This is a critical care document  Critical Care Diagnosis:  Acute hypoxic respiratory failure requiring supplemental oxygen therapy  Critical care interventions: hands on evaluation, review of labs/radiographs/records and discussions; assessing and  managing the high probability of imminent or life-threatening deterioration of cardiorespiratory status.  Critical care time spent > 40 minutes.       I have spent >30 minutes on the day of the visit; time spent includes face to face time and non-face to face time preparing to see the patient (eg, review of tests), independently reviewing and interpreting medical records, both past and current; documenting clinical information in the electronic or other health record, and communicating results to the patient/family/caregiver and care coordinator and nursing team.      All diagnosis and differential diagnosis have been reviewed,  interpreted and communicated appropriately to care team. assessment and plan has been documented; I have personally reviewed the labs and test results that are presently available and pertinent to this hospital course; I have reviewed medical records based upon their availability.    All of the patient's questions have been  addressed and answered. Patient's is agreeable to the above stated plan.   I will continue to monitor closely and make adjustments to medical management as needed.    Cinthya Roper,    04/16/2024     This note was created with the assistance of Dragon voice recognition software. There may be transcription errors as a result of using this technology however minimal. Effort has been made to assure accuracy of transcription but any obvious errors or omissions should be clarified with the author of the document.

## 2024-04-17 VITALS
HEIGHT: 68 IN | WEIGHT: 155 LBS | BODY MASS INDEX: 23.49 KG/M2 | SYSTOLIC BLOOD PRESSURE: 101 MMHG | HEART RATE: 83 BPM | DIASTOLIC BLOOD PRESSURE: 65 MMHG | TEMPERATURE: 99 F | OXYGEN SATURATION: 95 % | RESPIRATION RATE: 20 BRPM

## 2024-04-17 PROBLEM — J18.9 PNEUMONIA: Status: ACTIVE | Noted: 2024-04-17

## 2024-04-17 LAB
BACTERIA SPEC CULT: NORMAL
GRAM STN SPEC: NORMAL

## 2024-04-17 PROCEDURE — 25000242 PHARM REV CODE 250 ALT 637 W/ HCPCS: Performed by: PHYSICIAN ASSISTANT

## 2024-04-17 PROCEDURE — 25000003 PHARM REV CODE 250: Performed by: PHYSICIAN ASSISTANT

## 2024-04-17 PROCEDURE — 63600175 PHARM REV CODE 636 W HCPCS: Performed by: PHYSICIAN ASSISTANT

## 2024-04-17 PROCEDURE — 63600175 PHARM REV CODE 636 W HCPCS: Performed by: INTERNAL MEDICINE

## 2024-04-17 PROCEDURE — S4991 NICOTINE PATCH NONLEGEND: HCPCS | Performed by: PHYSICIAN ASSISTANT

## 2024-04-17 PROCEDURE — 27000221 HC OXYGEN, UP TO 24 HOURS

## 2024-04-17 PROCEDURE — 99900035 HC TECH TIME PER 15 MIN (STAT)

## 2024-04-17 PROCEDURE — 94640 AIRWAY INHALATION TREATMENT: CPT

## 2024-04-17 PROCEDURE — 94760 N-INVAS EAR/PLS OXIMETRY 1: CPT

## 2024-04-17 PROCEDURE — 99900031 HC PATIENT EDUCATION (STAT)

## 2024-04-17 PROCEDURE — 25000003 PHARM REV CODE 250: Performed by: INTERNAL MEDICINE

## 2024-04-17 RX ORDER — LEVOFLOXACIN 750 MG/1
750 TABLET ORAL DAILY
Qty: 5 TABLET | Refills: 0 | Status: SHIPPED | OUTPATIENT
Start: 2024-04-17 | End: 2024-04-22

## 2024-04-17 RX ADMIN — IPRATROPIUM BROMIDE AND ALBUTEROL SULFATE 3 ML: .5; 3 SOLUTION RESPIRATORY (INHALATION) at 08:04

## 2024-04-17 RX ADMIN — AZITHROMYCIN MONOHYDRATE 500 MG: 500 INJECTION, POWDER, LYOPHILIZED, FOR SOLUTION INTRAVENOUS at 06:04

## 2024-04-17 RX ADMIN — BENZONATATE 200 MG: 100 CAPSULE ORAL at 08:04

## 2024-04-17 RX ADMIN — PREDNISONE 60 MG: 10 TABLET ORAL at 08:04

## 2024-04-17 RX ADMIN — CEFTRIAXONE SODIUM 1 G: 1 INJECTION, POWDER, FOR SOLUTION INTRAMUSCULAR; INTRAVENOUS at 05:04

## 2024-04-17 RX ADMIN — NICOTINE 1 PATCH: 21 PATCH, EXTENDED RELEASE TRANSDERMAL at 08:04

## 2024-04-17 RX ADMIN — GUAIFENESIN AND DEXTROMETHORPHAN 10 ML: 100; 10 SYRUP ORAL at 05:04

## 2024-04-17 NOTE — NURSING
Discharge instructions reviewed with patient and patient significant other. IV and tele discontinued. Pt stable, waiting for transport. Going home with self care.

## 2024-04-19 ENCOUNTER — TELEPHONE (OUTPATIENT)
Dept: INTERNAL MEDICINE | Facility: CLINIC | Age: 59
End: 2024-04-19
Payer: MEDICAID

## 2024-04-19 NOTE — TELEPHONE ENCOUNTER
Hospital F/U Appointment Date: 05/06/2024 @10am    Discharge Date:04/17/2024    Discharge Facility: [facility]    If External Facility, is Discharge paperwork available: N/A    Discharge Diagnosis: pneumonia     Specialty Referrals / Appointments? Follow up with PCP , DANIEL Stoner ( new patient)    Home Health Services or DME? none    Discharged Medication Reviewed?      Questions regarding medications?     Advised to bring ALL medications for visit:     Is the patient experiencing any symptoms today?     Clinic Phone number given to patient?       I attempted to contact patient but unable to reach . I left message to return call to [facility] at [phone].

## 2024-04-20 LAB
BACTERIA BLD CULT: NORMAL
BACTERIA BLD CULT: NORMAL

## 2024-05-11 NOTE — DISCHARGE SUMMARY
[facility] Hospital Medicine Discharge Summary    Admit Date: 4/15/2024  Discharge Date:   04/17/2024  Admitting Physician:  Team  Discharging Physician: Cinthya Roper DO.  Primary Care Physician: Geri, Primary Doctor      Discharge Diagnoses:  Community-acquired pneumonia  Tobacco abuse   History of tuberculosis   Hepatitis-C history of.    Hospital Course:   Chief Complaint: cough     HPI: (personally reviewed by me and is documented from initial H&P)      58 y.o. male with a past medical history of tobacco abuse, tuberculosis, and hepatitis-C who presented to [facility] on 4/15/2024 with c/o cough.       Patient reported productive cough, shortness of breath, chest pain, fever, and chills began about 12 days ago.  Patient reported yellow sputum production with T-max of 101°.    Patient denied nausea, vomiting, diarrhea, and abdominal pain.  Patient also endorsed smoking 2 packs of cigarettes daily.     Initial vital signs in ED were /69, pulse 87, respirations 19, temperature 36.9° C, and SpO2 90% on room air.  Labs revealed WBC 10.85, glucose 101, calcium 1.11, BNP less than 10, undetectable troponin, D-dimer 1.19, and lactic acid 1.  COVID/flu/RSV testing was negative.  EKG revealed normal sinus rhythm with heart rate of 77 beats per minute.  Chest x-ray revealed findings concerning for development of infectious process.  CTA chest revealed no pulmonary embolism identified with multifocal pneumonia.  Patient was placed on 2 L supplemental oxygen and given Albuterol nebulizer 10 mg, IV Dexamethasone 8 mg, IV Rocephin 1 g, and IV Azithromycin 500 mg in ED. Patient was admitted to hospital medicine service for further medical management.   Pt was seen and examined on the day of discharge  Patient was treated for pneumonia  Vitals:  VITAL SIGNS: 24 HRS MIN & MAX LAST   No data recorded 98.6 °F (37 °C)   No data recorded 101/65   No data recorded  83   No data recorded 20   No  "data recorded 95 %       Physical Exam:    General: Appears comfortable, no acute distress.  Integumentary: Warm, dry, intact.  Musculoskeletal: Purposeful movement noted.   Respiratory: No accessory muscle use. Breath sounds are equal.  Cardiovascular: Regular rate. No peripheral edema.      Procedures Performed: No admission procedures for hospital encounter.     Significant Diagnostic Studies: See Full reports for all details    No results for input(s): "WBC", "RBC", "HGB", "HCT", "MCV", "MCH", "MCHC", "RDW", "PLT", "MPV", "GRAN", "LYMPH", "MONO", "BASO", "NRBC" in the last 168 hours.    No results for input(s): "NA", "K", "CL", "CO2", "ANIONGAP", "BUN", "CREATININE", "GLU", "CALCIUM", "PH", "MG", "ALBUMIN", "PROT", "ALKPHOS", "ALT", "AST", "BILITOT" in the last 168 hours.     Microbiology Results (last 7 days)       Procedure Component Value Units Date/Time    Respiratory Culture [[id number]] Collected: 04/15/24 0935    Order Status: Completed Specimen: Sputum, Expectorated Updated: 04/17/24 1249     Respiratory Culture Normal respiratory gisella     GRAM STAIN Quality 3+      Many Gram positive cocci      Few Gram Negative Rods             X-Ray Chest 1 View  Narrative: EXAMINATION:  XR CHEST 1 VIEW    CLINICAL HISTORY:  chest pain;    TECHNIQUE:  Single view of the chest    COMPARISON:  12/27/2023    FINDINGS:  Development of a right greater than left mid to lower lung zone opacifications.    The cardiomediastinal silhouette is within normal limits.    No acute osseous abnormality.  Impression: Findings concerning for development of infectious process.  Recommend continued follow-up.    Electronically signed by: Varun Gomez  Date:    04/15/2024  Time:    07:12  CTA Chest Non-Coronary (PE Studies)  Narrative: EXAMINATION:  CTA CHEST NON CORONARY (PE STUDIES)    CLINICAL HISTORY:  Pulmonary embolism (PE) suspected, unknown D-dimer;    TECHNIQUE:  CTA imaging of the chest after IV contrast. Axial, coronal and " sagittal reconstructions, including MIP images, are reviewed. Dose length product 447 mGycm. Automatic exposure control, adjustment of mA/kV or iterative reconstruction technique used to limit radiation dose.    COMPARISON:  CT 12/27/2023    FINDINGS:  Diagnostic quality: Adequate    Pulmonary embolism: None identified.    Lung parenchyma: Scattered multifocal areas of ill-defined consolidation and ground-glass in both lungs.  Mild emphysema.  Bronchial wall thickening and some endobronchial debris in the left lower lobe.    Pleural effusion: None.    Mediastinum/carolyn: Normal heart size and thoracic aortic caliber.  Mild coronary artery calcifications.  No pathologically enlarged lymph node.    Chest wall/axilla: Left axillary and subpectoral lymph nodes are smaller since December with no pathologically enlarged lymph node identified today.    Upper abdomen: Small gallstone.    Bones: No acute osseous process.  Impression: No pulmonary embolism identified.    Multifocal pneumonia.    Electronically signed by: Asher Devlin  Date:    04/15/2024  Time:    06:16         Medication List        ASK your doctor about these medications      levoFLOXacin 750 MG tablet  Commonly known as: LEVAQUIN  Take 1 tablet (750 mg total) by mouth once daily. for 5 days  Ask about: Should I take this medication?               Where to Get Your Medications        These medications were sent to [facility]/pharmacy #[id number]  [place] [id number] [facility] AT [address] [address]      Phone: [phone]   levoFLOXacin 750 MG tablet          Explained in detail to the patient about the discharge plan, medications, and follow-up visits. Pt understands and agrees with the treatment plan  Discharge Disposition: Home or Self Care   Discharged Condition: stable  Diet-    Medications Per NH med rec  Activities as tolerated   Follow-up Information       No, Primary Doctor Follow up.    Why: [facility] Internal Medicine   We  will call you with an appointment date and time soon!             Irene Melendez FNP Follow up on 5/6/2024.    Specialty: Nurse Practitioner  Why: 10:00  Contact information:  [address] [facility] [address] [phone]                           For further questions contact hospitalist office    Discharge time 33 minutes    For worsening symptoms, chest pain, shortness of breath, increased abdominal pain, high grade fever, stroke or stroke like symptoms, immediately go to the nearest Emergency Room or call 911 as soon as possible.      Cinthya Flores M.D  Date of service 04/17/2024

## 2024-07-22 PROBLEM — J18.9 PNEUMONIA: Status: RESOLVED | Noted: 2024-04-17 | Resolved: 2024-07-22

## 2025-02-02 ENCOUNTER — HOSPITAL ENCOUNTER (EMERGENCY)
Facility: HOSPITAL | Age: 60
Discharge: HOME | End: 2025-02-02
Attending: EMERGENCY MEDICINE
Payer: MEDICAID

## 2025-02-02 VITALS
BODY MASS INDEX: 26.32 KG/M2 | SYSTOLIC BLOOD PRESSURE: 145 MMHG | HEART RATE: 78 BPM | HEIGHT: 68 IN | WEIGHT: 173.63 LBS | TEMPERATURE: 98 F | DIASTOLIC BLOOD PRESSURE: 89 MMHG | RESPIRATION RATE: 17 BRPM | OXYGEN SATURATION: 100 %

## 2025-02-02 DIAGNOSIS — K40.90 RIGHT INGUINAL HERNIA: ICD-10-CM

## 2025-02-02 DIAGNOSIS — L02.92 FURUNCULOSIS: Primary | ICD-10-CM

## 2025-02-02 PROCEDURE — 99284 EMERGENCY DEPT VISIT MOD MDM: CPT

## 2025-02-02 PROCEDURE — 25000003 PHARM REV CODE 250: Performed by: EMERGENCY MEDICINE

## 2025-02-02 RX ORDER — SULFAMETHOXAZOLE AND TRIMETHOPRIM 800; 160 MG/1; MG/1
2 TABLET ORAL
Status: COMPLETED | OUTPATIENT
Start: 2025-02-02 | End: 2025-02-02

## 2025-02-02 RX ORDER — MUPIROCIN 20 MG/G
1 OINTMENT TOPICAL
Status: COMPLETED | OUTPATIENT
Start: 2025-02-02 | End: 2025-02-02

## 2025-02-02 RX ORDER — MUPIROCIN 20 MG/G
OINTMENT TOPICAL 3 TIMES DAILY
Qty: 30 G | Refills: 1 | Status: SHIPPED | OUTPATIENT
Start: 2025-02-02

## 2025-02-02 RX ORDER — SULFAMETHOXAZOLE AND TRIMETHOPRIM 800; 160 MG/1; MG/1
2 TABLET ORAL 2 TIMES DAILY
Qty: 28 TABLET | Refills: 0 | Status: SHIPPED | OUTPATIENT
Start: 2025-02-02 | End: 2025-02-09

## 2025-02-02 RX ADMIN — MUPIROCIN 1 TUBE: 20 OINTMENT TOPICAL at 05:02

## 2025-02-02 RX ADMIN — SULFAMETHOXAZOLE AND TRIMETHOPRIM 2 TABLET: 800; 160 TABLET ORAL at 05:02

## 2025-02-02 NOTE — DISCHARGE INSTRUCTIONS
Antibiotics by mouth and by ointment as prescribed.      We will call you for surgery clinic appointments to talk about hernia repair and to follow-up on today's boils for possible drainage.

## 2025-02-02 NOTE — ED PROVIDER NOTES
Encounter Date: 2/2/2025       History     Chief Complaint   Patient presents with    Abscess     States multiple abscesses to axilla and right forearm.       Chronic recurring skin abscesses, right forearm and left axilla, works trimming trees.  Also chronic right inguinal hernia, recurrent after previous herniorrhaphy 10 years ago, would like to see surgery about possible repeat repair.  No fever.  No recent antibiotics.  No other complaints.    The history is provided by the patient. No  was used.     Review of patient's allergies indicates:  No Known Allergies  Past Medical History:   Diagnosis Date    Pneumonia 04/17/2024    Unspecified viral hepatitis C without hepatic coma      Past Surgical History:   Procedure Laterality Date    HERNIA REPAIR       No family history on file.  Social History     Tobacco Use    Smoking status: Every Day     Types: Cigarettes    Smokeless tobacco: Never   Substance Use Topics    Alcohol use: Not Currently    Drug use: Not Currently     Review of Systems   Gastrointestinal:         See HPI   Skin:  Positive for color change.        See HPI       Physical Exam     Initial Vitals [02/02/25 0524]   BP Pulse Resp Temp SpO2   (!) 145/89 78 17 97.5 °F (36.4 °C) 100 %      MAP       --         Physical Exam    Nursing note and vitals reviewed.  Constitutional: He appears well-developed and well-nourished. No distress.     Neurological: He is alert and oriented to person, place, and time. He has normal strength.   Skin:   Scattered minor cutaneous abscesses on right forearm and left axilla.  No cellulitis.         ED Course   Procedures  Labs Reviewed - No data to display       Imaging Results    None          Medications   sulfamethoxazole-trimethoprim 800-160mg per tablet 2 tablet (has no administration in time range)   mupirocin 2 % ointment 1 Tube (has no administration in time range)     5:41 AM D/C instructions:        Antibiotics by mouth and by ointment as  prescribed.      We will call you for surgery clinic appointments to talk about hernia repair and to follow-up on today's boils for possible drainage.        Medical Decision Making  Problems Addressed:  Furunculosis: acute illness or injury  Right inguinal hernia: chronic illness or injury    Risk  Prescription drug management.                                      Clinical Impression:  Final diagnoses:  [L02.92] Furunculosis (Primary)  [K40.90] Right inguinal hernia          ED Disposition Condition    Discharge Stable          ED Prescriptions       Medication Sig Dispense Start Date End Date Auth. Provider    sulfamethoxazole-trimethoprim 800-160mg (BACTRIM DS) 800-160 mg Tab Take 2 tablets by mouth 2 (two) times daily. for 7 days 28 tablet 2/2/2025 2/9/2025 Gibson Machado MD    mupirocin (BACTROBAN) 2 % ointment Apply topically 3 (three) times daily. 30 g 2/2/2025 -- Gibson Machado MD          Follow-up Information       Follow up With Specialties Details Why Contact Info    [facility] Emergency Medicine  As needed [address] [facility] [id number] [phone]             Gibson Machado MD  02/02/25 8891